# Patient Record
Sex: FEMALE | Race: WHITE | Employment: OTHER | URBAN - METROPOLITAN AREA
[De-identification: names, ages, dates, MRNs, and addresses within clinical notes are randomized per-mention and may not be internally consistent; named-entity substitution may affect disease eponyms.]

---

## 2017-01-12 ENCOUNTER — HOSPITAL ENCOUNTER (OUTPATIENT)
Dept: MAMMOGRAPHY | Facility: CLINIC | Age: 82
Discharge: HOME | End: 2017-01-12
Attending: INTERNAL MEDICINE | Admitting: INTERNAL MEDICINE
Payer: MEDICARE

## 2017-01-12 DIAGNOSIS — Z12.31 VISIT FOR SCREENING MAMMOGRAM: ICD-10-CM

## 2017-01-12 PROCEDURE — G0202 SCR MAMMO BI INCL CAD: HCPCS | Mod: 52

## 2017-02-24 ENCOUNTER — TRANSFERRED RECORDS (OUTPATIENT)
Dept: HEALTH INFORMATION MANAGEMENT | Facility: CLINIC | Age: 82
End: 2017-02-24

## 2017-07-21 ENCOUNTER — TRANSFERRED RECORDS (OUTPATIENT)
Dept: HEALTH INFORMATION MANAGEMENT | Facility: CLINIC | Age: 82
End: 2017-07-21

## 2017-08-02 ENCOUNTER — TRANSFERRED RECORDS (OUTPATIENT)
Dept: HEALTH INFORMATION MANAGEMENT | Facility: CLINIC | Age: 82
End: 2017-08-02

## 2017-08-07 ENCOUNTER — HOSPITAL ENCOUNTER (OUTPATIENT)
Dept: ULTRASOUND IMAGING | Facility: CLINIC | Age: 82
End: 2017-08-07
Attending: INTERNAL MEDICINE
Payer: MEDICARE

## 2017-08-07 PROCEDURE — 88341 IMHCHEM/IMCYTCHM EA ADD ANTB: CPT | Mod: 26 | Performed by: INTERNAL MEDICINE

## 2017-08-07 PROCEDURE — 88360 TUMOR IMMUNOHISTOCHEM/MANUAL: CPT | Mod: 26 | Performed by: INTERNAL MEDICINE

## 2017-08-07 PROCEDURE — 88305 TISSUE EXAM BY PATHOLOGIST: CPT | Mod: 26 | Performed by: INTERNAL MEDICINE

## 2017-08-07 PROCEDURE — 88377 M/PHMTRC ALYS ISHQUANT/SEMIQ: CPT | Performed by: PATHOLOGY

## 2017-08-07 PROCEDURE — 25000125 ZZHC RX 250: Performed by: RADIOLOGY

## 2017-08-07 PROCEDURE — 00000158 ZZHCL STATISTIC H-FISH PROCESS B/S: Performed by: INTERNAL MEDICINE

## 2017-08-07 PROCEDURE — 88341 IMHCHEM/IMCYTCHM EA ADD ANTB: CPT | Mod: XU | Performed by: INTERNAL MEDICINE

## 2017-08-07 PROCEDURE — 88342 IMHCHEM/IMCYTCHM 1ST ANTB: CPT | Mod: 26 | Performed by: INTERNAL MEDICINE

## 2017-08-07 PROCEDURE — 88305 TISSUE EXAM BY PATHOLOGIST: CPT | Performed by: INTERNAL MEDICINE

## 2017-08-07 PROCEDURE — 76942 ECHO GUIDE FOR BIOPSY: CPT

## 2017-08-07 PROCEDURE — 88360 TUMOR IMMUNOHISTOCHEM/MANUAL: CPT | Performed by: INTERNAL MEDICINE

## 2017-08-07 PROCEDURE — 27211101 US BIOPSY CORE LYMPH NODE

## 2017-08-07 PROCEDURE — 00000159 ZZHCL STATISTIC H-SEND OUTS PREP: Performed by: INTERNAL MEDICINE

## 2017-08-07 PROCEDURE — 88342 IMHCHEM/IMCYTCHM 1ST ANTB: CPT | Performed by: INTERNAL MEDICINE

## 2017-08-07 RX ADMIN — LIDOCAINE HYDROCHLORIDE 3 ML: 10 INJECTION, SOLUTION EPIDURAL; INFILTRATION; INTRACAUDAL; PERINEURAL at 11:08

## 2017-08-07 NOTE — PROGRESS NOTES
Patient discharged to home, all discharge criteria were met. Discharged via wheelchair with daughter in stable condition.

## 2017-08-07 NOTE — PROGRESS NOTES
Left sternal lymph node biopsy completed per Dr. Aleman. Patient had increased pain with each sample taken, Dr. Aleman felt he may have irritated a nerve. Patient anxious and shaking post procedure, daughter stated this has happened to her in the past with previous procedures. No sedation was used for this procedure, patient transferred to holding room via cart in stable condition.

## 2017-08-14 LAB — COPATH REPORT: NORMAL

## 2017-08-15 LAB — COPATH REPORT: NORMAL

## 2017-08-17 DIAGNOSIS — C50.919 BREAST CANCER (H): Primary | ICD-10-CM

## 2017-09-25 ENCOUNTER — HOSPITAL ENCOUNTER (OUTPATIENT)
Dept: CARDIOLOGY | Facility: CLINIC | Age: 82
Discharge: HOME | End: 2017-09-25
Attending: INTERNAL MEDICINE | Admitting: INTERNAL MEDICINE
Payer: MEDICARE

## 2017-09-25 DIAGNOSIS — C50.412 MALIGNANT NEOPLASM OF UPPER-OUTER QUADRANT OF LEFT FEMALE BREAST, UNSPECIFIED ESTROGEN RECEPTOR STATUS (H): ICD-10-CM

## 2017-09-25 DIAGNOSIS — Z51.11 CHEMOTHERAPY MANAGEMENT, ENCOUNTER FOR: ICD-10-CM

## 2017-09-25 PROCEDURE — 93306 TTE W/DOPPLER COMPLETE: CPT | Mod: 26 | Performed by: INTERNAL MEDICINE

## 2017-09-25 PROCEDURE — 93306 TTE W/DOPPLER COMPLETE: CPT

## 2017-09-25 PROCEDURE — 0399T ZZHC MYOCARDIAL STRAIN IMAGING: CPT | Performed by: INTERNAL MEDICINE

## 2017-09-26 ENCOUNTER — HOSPITAL ENCOUNTER (OUTPATIENT)
Facility: CLINIC | Age: 82
Setting detail: OBSERVATION
Discharge: HOME | End: 2017-09-27
Attending: EMERGENCY MEDICINE | Admitting: INTERNAL MEDICINE
Payer: MEDICARE

## 2017-09-26 ENCOUNTER — APPOINTMENT (OUTPATIENT)
Dept: GENERAL RADIOLOGY | Facility: CLINIC | Age: 82
End: 2017-09-26
Attending: EMERGENCY MEDICINE
Payer: MEDICARE

## 2017-09-26 DIAGNOSIS — J06.9 VIRAL URI: Primary | ICD-10-CM

## 2017-09-26 DIAGNOSIS — J06.9 UPPER RESPIRATORY TRACT INFECTION, UNSPECIFIED TYPE: ICD-10-CM

## 2017-09-26 DIAGNOSIS — M62.81 GENERALIZED MUSCLE WEAKNESS: ICD-10-CM

## 2017-09-26 LAB
ANION GAP SERPL CALCULATED.3IONS-SCNC: 9 MMOL/L (ref 3–14)
BASOPHILS # BLD AUTO: 0 10E9/L (ref 0–0.2)
BASOPHILS NFR BLD AUTO: 0.6 %
BUN SERPL-MCNC: 11 MG/DL (ref 7–30)
CALCIUM SERPL-MCNC: 10 MG/DL (ref 8.5–10.1)
CHLORIDE SERPL-SCNC: 100 MMOL/L (ref 94–109)
CO2 BLDCOV-SCNC: 22 MMOL/L (ref 21–28)
CO2 SERPL-SCNC: 25 MMOL/L (ref 20–32)
CREAT SERPL-MCNC: 0.94 MG/DL (ref 0.52–1.04)
DIFFERENTIAL METHOD BLD: ABNORMAL
EOSINOPHIL # BLD AUTO: 0 10E9/L (ref 0–0.7)
EOSINOPHIL NFR BLD AUTO: 0.9 %
ERYTHROCYTE [DISTWIDTH] IN BLOOD BY AUTOMATED COUNT: 12.5 % (ref 10–15)
GFR SERPL CREATININE-BSD FRML MDRD: 57 ML/MIN/1.7M2
GLUCOSE SERPL-MCNC: 134 MG/DL (ref 70–99)
HCT VFR BLD AUTO: 31.6 % (ref 35–47)
HGB BLD-MCNC: 10.8 G/DL (ref 11.7–15.7)
IMM GRANULOCYTES # BLD: 0 10E9/L (ref 0–0.4)
IMM GRANULOCYTES NFR BLD: 0.2 %
LACTATE BLD-SCNC: 1 MMOL/L (ref 0.7–2.1)
LYMPHOCYTES # BLD AUTO: 0.5 10E9/L (ref 0.8–5.3)
LYMPHOCYTES NFR BLD AUTO: 10.7 %
MCH RBC QN AUTO: 31.5 PG (ref 26.5–33)
MCHC RBC AUTO-ENTMCNC: 34.2 G/DL (ref 31.5–36.5)
MCV RBC AUTO: 92 FL (ref 78–100)
MONOCYTES # BLD AUTO: 0.4 10E9/L (ref 0–1.3)
MONOCYTES NFR BLD AUTO: 8.8 %
NEUTROPHILS # BLD AUTO: 3.7 10E9/L (ref 1.6–8.3)
NEUTROPHILS NFR BLD AUTO: 78.8 %
NRBC # BLD AUTO: 0 10*3/UL
NRBC BLD AUTO-RTO: 0 /100
PCO2 BLDV: 23 MM HG (ref 40–50)
PH BLDV: 7.59 PH (ref 7.32–7.43)
PLATELET # BLD AUTO: 186 10E9/L (ref 150–450)
PO2 BLDV: 44 MM HG (ref 25–47)
POTASSIUM SERPL-SCNC: 3.8 MMOL/L (ref 3.4–5.3)
RBC # BLD AUTO: 3.43 10E12/L (ref 3.8–5.2)
SAO2 % BLDV FROM PO2: 88 %
SODIUM SERPL-SCNC: 134 MMOL/L (ref 133–144)
WBC # BLD AUTO: 4.7 10E9/L (ref 4–11)

## 2017-09-26 PROCEDURE — 99218 ZZC INITIAL OBSERVATION CARE,LEVL I: CPT | Performed by: INTERNAL MEDICINE

## 2017-09-26 PROCEDURE — 25000128 H RX IP 250 OP 636: Performed by: EMERGENCY MEDICINE

## 2017-09-26 PROCEDURE — 99285 EMERGENCY DEPT VISIT HI MDM: CPT | Mod: 25

## 2017-09-26 PROCEDURE — 99207 ZZC CDG-HISTORY COMP: MEETS EXP. PROB FOCUSED- DOWN CODED LACK OF PFSH: CPT | Performed by: INTERNAL MEDICINE

## 2017-09-26 PROCEDURE — 85025 COMPLETE CBC W/AUTO DIFF WBC: CPT | Performed by: EMERGENCY MEDICINE

## 2017-09-26 PROCEDURE — 96360 HYDRATION IV INFUSION INIT: CPT

## 2017-09-26 PROCEDURE — 83605 ASSAY OF LACTIC ACID: CPT

## 2017-09-26 PROCEDURE — 80048 BASIC METABOLIC PNL TOTAL CA: CPT | Performed by: EMERGENCY MEDICINE

## 2017-09-26 PROCEDURE — 82803 BLOOD GASES ANY COMBINATION: CPT

## 2017-09-26 PROCEDURE — 25000132 ZZH RX MED GY IP 250 OP 250 PS 637: Mod: GY | Performed by: EMERGENCY MEDICINE

## 2017-09-26 PROCEDURE — 71020 XR CHEST 2 VW: CPT

## 2017-09-26 RX ORDER — LIDOCAINE 50 MG/G
1 PATCH TOPICAL
Status: DISCONTINUED | OUTPATIENT
Start: 2017-09-26 | End: 2017-09-27 | Stop reason: HOSPADM

## 2017-09-26 RX ORDER — BISACODYL 10 MG
10 SUPPOSITORY, RECTAL RECTAL DAILY PRN
Status: DISCONTINUED | OUTPATIENT
Start: 2017-09-26 | End: 2017-09-27 | Stop reason: HOSPADM

## 2017-09-26 RX ORDER — ONDANSETRON 2 MG/ML
4 INJECTION INTRAMUSCULAR; INTRAVENOUS EVERY 6 HOURS PRN
Status: DISCONTINUED | OUTPATIENT
Start: 2017-09-26 | End: 2017-09-27 | Stop reason: HOSPADM

## 2017-09-26 RX ORDER — PROCHLORPERAZINE MALEATE 5 MG
5 TABLET ORAL EVERY 6 HOURS PRN
Status: DISCONTINUED | OUTPATIENT
Start: 2017-09-26 | End: 2017-09-27 | Stop reason: HOSPADM

## 2017-09-26 RX ORDER — SODIUM CHLORIDE 9 MG/ML
INJECTION, SOLUTION INTRAVENOUS CONTINUOUS
Status: ACTIVE | OUTPATIENT
Start: 2017-09-26 | End: 2017-09-27

## 2017-09-26 RX ORDER — AMOXICILLIN 250 MG
1-2 CAPSULE ORAL 2 TIMES DAILY PRN
Status: DISCONTINUED | OUTPATIENT
Start: 2017-09-26 | End: 2017-09-27 | Stop reason: HOSPADM

## 2017-09-26 RX ORDER — ACETAMINOPHEN 500 MG
1000 TABLET ORAL 3 TIMES DAILY
Status: DISCONTINUED | OUTPATIENT
Start: 2017-09-27 | End: 2017-09-27 | Stop reason: HOSPADM

## 2017-09-26 RX ORDER — POLYETHYLENE GLYCOL 3350 17 G/17G
17 POWDER, FOR SOLUTION ORAL DAILY
Status: DISCONTINUED | OUTPATIENT
Start: 2017-09-27 | End: 2017-09-27 | Stop reason: HOSPADM

## 2017-09-26 RX ORDER — PSEUDOEPHEDRINE HCL 30 MG
60 TABLET ORAL EVERY 6 HOURS
Status: DISCONTINUED | OUTPATIENT
Start: 2017-09-27 | End: 2017-09-27 | Stop reason: HOSPADM

## 2017-09-26 RX ORDER — MULTIVIT WITH MINERALS/LUTEIN
1 TABLET ORAL DAILY
COMMUNITY
End: 2021-01-01

## 2017-09-26 RX ORDER — ZOLPIDEM TARTRATE 5 MG/1
5 TABLET ORAL
Status: DISCONTINUED | OUTPATIENT
Start: 2017-09-26 | End: 2017-09-27 | Stop reason: HOSPADM

## 2017-09-26 RX ORDER — ONDANSETRON 4 MG/1
4 TABLET, ORALLY DISINTEGRATING ORAL EVERY 6 HOURS PRN
Status: DISCONTINUED | OUTPATIENT
Start: 2017-09-26 | End: 2017-09-27 | Stop reason: HOSPADM

## 2017-09-26 RX ORDER — HYDROCODONE BITARTRATE AND ACETAMINOPHEN 5; 325 MG/1; MG/1
1-2 TABLET ORAL EVERY 6 HOURS PRN
COMMUNITY
End: 2018-03-17

## 2017-09-26 RX ORDER — DIPHENHYDRAMINE HCL 25 MG
25 CAPSULE ORAL
Status: DISCONTINUED | OUTPATIENT
Start: 2017-09-26 | End: 2017-09-27 | Stop reason: HOSPADM

## 2017-09-26 RX ORDER — NALOXONE HYDROCHLORIDE 0.4 MG/ML
.1-.4 INJECTION, SOLUTION INTRAMUSCULAR; INTRAVENOUS; SUBCUTANEOUS
Status: DISCONTINUED | OUTPATIENT
Start: 2017-09-26 | End: 2017-09-27 | Stop reason: HOSPADM

## 2017-09-26 RX ORDER — GUAIFENESIN 600 MG/1
600 TABLET, EXTENDED RELEASE ORAL 2 TIMES DAILY
Status: DISCONTINUED | OUTPATIENT
Start: 2017-09-26 | End: 2017-09-27 | Stop reason: HOSPADM

## 2017-09-26 RX ORDER — PROCHLORPERAZINE 25 MG
12.5 SUPPOSITORY, RECTAL RECTAL EVERY 12 HOURS PRN
Status: DISCONTINUED | OUTPATIENT
Start: 2017-09-26 | End: 2017-09-27 | Stop reason: HOSPADM

## 2017-09-26 RX ADMIN — GUAIFENESIN AND DEXTROMETHORPHAN HYDROBROMIDE 1 TABLET: 600; 30 TABLET, EXTENDED RELEASE ORAL at 22:11

## 2017-09-26 RX ADMIN — SODIUM CHLORIDE 500 ML: 9 INJECTION, SOLUTION INTRAVENOUS at 21:41

## 2017-09-26 ASSESSMENT — ENCOUNTER SYMPTOMS
COUGH: 1
SHORTNESS OF BREATH: 1
SINUS PRESSURE: 1

## 2017-09-26 NOTE — IP AVS SNAPSHOT
MRN:[id number]                      After Visit Summary   9/26/2017    Lakisha Burroughs    MRN: [id number]           Thank you!     Thank you for choosing [facility] for your care. Our goal is always to provide you with excellent care. Hearing back from our patients is one way we can continue to improve our services. Please take a few minutes to complete the written survey that you may receive in the mail after you visit. If you would like to speak to someone directly about your visit please contact Patient Relations at [phone]. Thank you!          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 You were treated supportively with PO Mucinex and Sudafed along with Tylenol for pain. Your symptoms improved and you were safe to discharge home.                  Who to Call     For medical emergencies, please call 911.  For non-urgent questions about your medical care, please call your primary care provider or clinic, [phone]          Attending Provider     Provider Specialty    Kasandra Babcock MD Emergency Medicine    [place] MD Internal Medicine       Primary Care Provider Office Phone # Fax #    Kishore Valladares [phone]      After Care Instructions     Activity       Your activity upon discharge: activity as tolerated            Diet       Follow this diet upon discharge: Orders Placed This Encounter      Regular Diet Adult            Discharge Instructions       Continue scheduled Tylenol at home for the next 2-3 days with continued persistent  "cough, while doing so refrain from using your home Norco due to concern for Tylenol overdose.                  Follow-up Appointments     Follow-up and recommended labs and tests        Follow up with primary care provider within 7 days for hospital follow- up.  No follow up labs or test are needed.                             Pending Results     No orders found for last 3 day(s).            Statement of Approval     Ordered          17 1101  I have reviewed and agree with all the recommendations and orders detailed in this document.  EFFECTIVE NOW     Approved and electronically signed by:  Veronica Cunningham PA-C             Admission Information     Date & Time Provider Department Dept. Phone    2017 Ron Stephens MD [facility] Observation Department [phone]      Your Vitals Were     Blood Pressure Pulse Temperature Respirations Height Weight    140/77 (BP Location: Right arm) 83 97.6  F (36.4  C) (Oral) 18 1.803 m (5' 11\") 71.9 kg (158 lb 9.6 oz)    Pulse Oximetry BMI (Body Mass Index)                96% 22.12 kg/m2          [email or web address] Information     [email or web address] lets you send messages to your doctor, view your test results, renew your prescriptions, schedule appointments and more. To sign up, go to [email or web address] . Click on \"Log in\" on the left side of the screen, which will take you to the Welcome page. Then click on \"Sign up Now\" on the right side of the page.     You will be asked to enter the access code listed below, as well as some personal information. Please follow the directions to create your username and password.     Your access code is: [email or web address]  Expires: 2017 12:05 PM     Your access code will  in 90 days. If you need help or a new code, please call your [facility] clinic or [phone].        Care EveryWhere ID     This is your Care EveryWhere ID. This could be used by other organizations to access your [facility] medical " records  [id number]        Equal Access to Services     LEI ECHEVERRIA : Hadii aad ku hadleonrahul Cerrato, waaxda luqadaha, qaybta kaalmaerik sahni, katelyn guerrero. So [facility] [phone].    ATENCIÓN: Si habla español, tiene a guerrero disposición servicios gratuitos de asistencia lingüística. Llame al [phone].    We comply with applicable federal civil rights laws and [place] laws. We do not discriminate on the basis of race, color, national origin, age, disability sex, sexual orientation or gender identity.               Review of your medicines      START taking        Dose / Directions    acetaminophen 500 MG tablet   Commonly known as:  TYLENOL        Dose:  1000 mg   Take 2 tablets (1,000 mg) by mouth 3 times daily   Refills:  0       guaiFENesin 600 MG 12 hr tablet   Commonly known as:  MUCINEX        Dose:  600 mg   Take 1 tablet (600 mg) by mouth 2 times daily   Quantity:  28 tablet   Refills:  0       pseudoePHEDrine 60 MG tablet   Commonly known as:  SUDAFED        Dose:  60 mg   Take 1 tablet (60 mg) by mouth every 6 hours   Quantity:  112 tablet   Refills:  0         CONTINUE these medicines which have NOT CHANGED        Dose / Directions    ALEVE PO        Dose:  220 mg   Take 220 mg by mouth daily as needed for moderate pain   Refills:  0       BENADRYL PO        Dose:  25 mg   Take 25 mg by mouth nightly as needed   Refills:  0       CENTRUM SILVER per tablet        Dose:  1 tablet   Take 1 tablet by mouth daily   Refills:  0       HYDROcodone-acetaminophen 5-325 MG per tablet   Commonly known as:  NORCO        Dose:  1-2 tablet   Take 1-2 tablets by mouth every 6 hours as needed for moderate to severe pain   Refills:  0       VITAMIN B 12 PO        Dose:  1 tablet   Take 1 tablet by mouth daily Chewable tablet   Refills:  0       VITAMIN D (CHOLECALCIFEROL) PO        Dose:  1000 Units   Take 1,000 Units by mouth daily   Refills:  0       ZOLPIDEM TARTRATE PO        Dose:  5  mg   Take 5 mg by mouth nightly as needed for sleep   Refills:  0            Where to get your medicines      Some of these will need a paper prescription and others can be bought over the counter. Ask your nurse if you have questions.     You don't need a prescription for these medications     acetaminophen 500 MG tablet    guaiFENesin 600 MG 12 hr tablet    pseudoePHEDrine 60 MG tablet                Protect others around you: Learn how to safely use, store and throw away your medicines at www.disposemymeds.org.             Medication List: This is a list of all your medications and when to take them. Check marks below indicate your daily home schedule. Keep this list as a reference.      Medications           Morning Afternoon Evening Bedtime As Needed    acetaminophen 500 MG tablet   Commonly known as:  TYLENOL   Take 2 tablets (1,000 mg) by mouth 3 times daily   Last time this was given:  1,000 mg on 9/27/2017  8:41 AM                                ALEVE PO   Take 220 mg by mouth daily as needed for moderate pain                                BENADRYL PO   Take 25 mg by mouth nightly as needed                                CENTRUM SILVER per tablet   Take 1 tablet by mouth daily                                guaiFENesin 600 MG 12 hr tablet   Commonly known as:  MUCINEX   Take 1 tablet (600 mg) by mouth 2 times daily   Last time this was given:  600 mg on 9/27/2017  8:41 AM                                HYDROcodone-acetaminophen 5-325 MG per tablet   Commonly known as:  NORCO   Take 1-2 tablets by mouth every 6 hours as needed for moderate to severe pain                                pseudoePHEDrine 60 MG tablet   Commonly known as:  SUDAFED   Take 1 tablet (60 mg) by mouth every 6 hours   Last time this was given:  60 mg on 9/27/2017 12:11 PM                                VITAMIN B 12 PO   Take 1 tablet by mouth daily Chewable tablet                                VITAMIN D (CHOLECALCIFEROL) PO   Take  1,000 Units by mouth daily                                ZOLPIDEM TARTRATE PO   Take 5 mg by mouth nightly as needed for sleep   Last time this was given:  5 mg on 9/27/2017  1:27 AM

## 2017-09-26 NOTE — IP AVS SNAPSHOT
[facility] Observation Department    [address]    [facility] [address]    Phone:  [phone]                                       After Visit Summary   9/26/2017    Lakisha Burroughs    MRN: [id number]           After Visit Summary Signature Page     I have received my discharge instructions, and my questions have been answered. I have discussed any challenges I see with this plan with the nurse or doctor.    ..........................................................................................................................................  Patient/Patient Representative Signature      ..........................................................................................................................................  Patient Representative Print Name and Relationship to Patient    ..................................................               ................................................  Date                                            Time    ..........................................................................................................................................  Reviewed by Signature/Title    ...................................................              ..............................................  Date                                                            Time

## 2017-09-27 VITALS
OXYGEN SATURATION: 96 % | WEIGHT: 158.6 LBS | BODY MASS INDEX: 22.2 KG/M2 | TEMPERATURE: 97.6 F | SYSTOLIC BLOOD PRESSURE: 140 MMHG | RESPIRATION RATE: 18 BRPM | HEIGHT: 71 IN | DIASTOLIC BLOOD PRESSURE: 77 MMHG | HEART RATE: 83 BPM

## 2017-09-27 PROCEDURE — 25000128 H RX IP 250 OP 636: Performed by: INTERNAL MEDICINE

## 2017-09-27 PROCEDURE — 99217 ZZC OBSERVATION CARE DISCHARGE: CPT | Performed by: PHYSICIAN ASSISTANT

## 2017-09-27 PROCEDURE — 96361 HYDRATE IV INFUSION ADD-ON: CPT

## 2017-09-27 PROCEDURE — G0378 HOSPITAL OBSERVATION PER HR: HCPCS

## 2017-09-27 PROCEDURE — 25000132 ZZH RX MED GY IP 250 OP 250 PS 637: Mod: GY | Performed by: INTERNAL MEDICINE

## 2017-09-27 PROCEDURE — A9270 NON-COVERED ITEM OR SERVICE: HCPCS | Mod: GY | Performed by: INTERNAL MEDICINE

## 2017-09-27 RX ORDER — PSEUDOEPHEDRINE HCL 60 MG
60 TABLET ORAL EVERY 6 HOURS
Qty: 112 TABLET | COMMUNITY
Start: 2017-09-27 | End: 2018-03-17

## 2017-09-27 RX ORDER — ACETAMINOPHEN 500 MG
1000 TABLET ORAL 3 TIMES DAILY
Refills: 0 | COMMUNITY
Start: 2017-09-27

## 2017-09-27 RX ORDER — GUAIFENESIN 600 MG/1
600 TABLET, EXTENDED RELEASE ORAL 2 TIMES DAILY
Qty: 28 TABLET | Refills: 0 | COMMUNITY
Start: 2017-09-27 | End: 2018-03-17

## 2017-09-27 RX ADMIN — PSEUDOEPHEDRINE HCL 60 MG: 30 TABLET, FILM COATED ORAL at 12:11

## 2017-09-27 RX ADMIN — PSEUDOEPHEDRINE HCL 60 MG: 30 TABLET, FILM COATED ORAL at 08:41

## 2017-09-27 RX ADMIN — SENNOSIDES AND DOCUSATE SODIUM 1 TABLET: 8.6; 5 TABLET ORAL at 06:25

## 2017-09-27 RX ADMIN — GUAIFENESIN 600 MG: 600 TABLET, EXTENDED RELEASE ORAL at 08:41

## 2017-09-27 RX ADMIN — LIDOCAINE 1 PATCH: 50 PATCH CUTANEOUS at 00:12

## 2017-09-27 RX ADMIN — POLYETHYLENE GLYCOL 3350 17 G: 17 POWDER, FOR SOLUTION ORAL at 08:41

## 2017-09-27 RX ADMIN — ACETAMINOPHEN 1000 MG: 500 TABLET, FILM COATED ORAL at 08:41

## 2017-09-27 RX ADMIN — PSEUDOEPHEDRINE HCL 60 MG: 30 TABLET, FILM COATED ORAL at 01:19

## 2017-09-27 RX ADMIN — GUAIFENESIN 600 MG: 600 TABLET, EXTENDED RELEASE ORAL at 00:12

## 2017-09-27 RX ADMIN — SODIUM CHLORIDE: 9 INJECTION, SOLUTION INTRAVENOUS at 00:02

## 2017-09-27 RX ADMIN — ZOLPIDEM TARTRATE 5 MG: 5 TABLET, FILM COATED ORAL at 01:27

## 2017-09-27 NOTE — PROGRESS NOTES
CTS:    Per rounds with provider this morning, pt has no DC needs at this time.  Will complete order for SW.    Chula Alex, RN,BSN, CTS  [facility]  Care Coordination  [phone]

## 2017-09-27 NOTE — PLAN OF CARE
Problem: Patient Care Overview  Goal: Plan of Care/Patient Progress Review  Outcome: Adequate for Discharge Date Met:  09/27/17  Patient's After Visit Summary was reviewed with patient and Daughter  Patient verbalized understanding of After Visit Summary, recommended follow up and was given an opportunity to ask questions.   Discharge medications sent home with patient/family: NA  Discharged with Daughter     OBSERVATION patient END time: 1320

## 2017-09-27 NOTE — PLAN OF CARE
ROOM # 207    Living Situation (if not independent, order SW consult): Home, alone. Two-story home.  Facility name:   : [facility] ([phone]) - Daughter    Activity level at baseline: Independent  Activity level on admit: SBA      Patient registered to observation; given Patient Bill of Rights; given the opportunity to ask questions about observation status and their plan of care.  Patient has been oriented to the observation room, bathroom and call light is in place.    Discussed discharge goals and expectations with patient/family.

## 2017-09-27 NOTE — PLAN OF CARE
Problem: Patient Care Overview  Goal: Plan of Care/Patient Progress Review  Outcome: Improving  PRIMARY DIAGNOSIS: Upper Respiratory Infection  OUTPATIENT/OBSERVATION GOALS TO BE MET BEFORE DISCHARGE:  1. ADLs back to baseline: Yes      2. Activity and level of assistance: Ambulating independently.      3. Pain status: Pain free.      4. Return to near baseline physical activity: Yes          Discharge Planner Nurse   Safe discharge environment identified: Yes  Barriers to discharge: Pending medical clearance       Entered by: Jasbir You 09/27/2017 9:37 AM     Please review provider order for any additional goals.   Nurse to notify provider when observation goals have been met and patient is ready for discharge.     Pt A&O, VSS. C/o occasional productive cough producing white sputum. Pain with cough d/t Hx sternal pain but otherwise pain free. Pt reports she is improving, and able to breath. She is up with a SBA to bathroom, tolerated walking in hallway.

## 2017-09-27 NOTE — H&P
[facility]  History and Physical   Hospitalist Service    Ron Stephens MD    Lakisha Burroughs MRN# [id number]   YOB: 1935 Age: 81 year old      Date of Admission:  9/26/2017           Assessment and Plan:   Lakisha Burroughs is an 81-year-old female with history of colon cancer in the 1970's and breast cancer for the last 8 years.  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  She has recently completed radiation therapy for this with plans to start chemotherapy in the near future.  She presented to the emergency department this evening for evaluation of 2 days of cough, nasal congestion, shortness of breath, and weakness.  Emergency department workup suggested viral respiratory tract infection.  There was no pneumonia by chest x-ray. There was no associated leukocytosis.  She did have tachycardia and has had significant chest pain related to her sternal metastases that has been exacerbated by her current cough.    Problem list:    1.  Viral upper respiratory tract infection.  She is having significant shortness of breath and anterior chest pain as a result of this.  Chest pain is likely related to her sternal metastases from breast cancer with exacerbation by coughing.  Lakisha will be admitted to observation for symptom management. Mucinex and  Pseudoephedrine will be scheduled every 12 hours.  Tylenol will be scheduled 1000 mg 3 times daily.  Lidoderm patch will be scheduled and ketoprofen cream will be available for use as needed.  Oxycodone will be available for use as needed.    2.  History of constipation with hydrocodone.  I will schedule Miralax and have other laxatives available for use as needed.    3. History of breast cancer with recent diagnosis of sternal metastases.    4.  Weakness.  Suspect due to one above.  She may also be a little dehydrated.  Normal saline will be given overnight.    Full code  Ambulate for DVT prophylaxis  Disposition:  Admitted under  observation status           Code Status:   Full Code         Primary Care Physician:   Kishore Valladares [phone]         Chief Complaint:   Cough and shortness of breath    History is obtained from Dr. Sadiq Banuelos, and the medical record         History of Present Illness:   Lakisha Burroughs is an 81-year-old female with history of colon cancer in the 1970's and breast cancer for the last 8 years.  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  She has recently completed radiation therapy for this with plans to start chemotherapy in the near future.  She presented to the emergency department this evening for evaluation of 2 days of cough, nasal congestion, shortness of breath, and weakness.  Emergency department workup suggested viral respiratory tract infection.  There was no pneumonia by chest x-ray. There was no associated leukocytosis.  She did have tachycardia and has had significant chest pain related to her sternal metastases that has been exacerbated by her current cough.             Past Medical History:     Patient Active Problem List   Diagnosis     Upper respiratory tract infection, unspecified type     Generalized muscle weakness     Viral URI      Past Medical History:   Diagnosis Date     Cancer (H)     colon, breast, ROWENA             Past Surgical History:     Past Surgical History:   Procedure Laterality Date     BREAST SURGERY       ORTHOPEDIC SURGERY              Home Medications:     Prior to Admission medications    Medication Sig Last Dose Taking? Auth Provider   HYDROcodone-acetaminophen (NORCO) 5-325 MG per tablet Take 1-2 tablets by mouth every 6 hours as needed for moderate to severe pain Past Month at Unknown time Yes Unknown, Entered By History   ZOLPIDEM TARTRATE PO Take 5 mg by mouth nightly as needed for sleep 9/25/2017 at pm Yes Unknown, Entered By History   Cyanocobalamin (VITAMIN B 12 PO) Take 1 tablet by mouth daily Chewable tablet 9/26/2017 at am Yes Unknown, Entered  "By History   Multiple Vitamins-Minerals (CENTRUM SILVER) per tablet Take 1 tablet by mouth daily 9/26/2017 at am Yes Unknown, Entered By History   VITAMIN D, CHOLECALCIFEROL, PO Take 1,000 Units by mouth daily 9/26/2017 at am Yes Unknown, Entered By History   Naproxen Sodium (ALEVE PO) Take 220 mg by mouth daily as needed for moderate pain Past Week at Unknown time Yes Unknown, Entered By History   DiphenhydrAMINE HCl (BENADRYL PO) Take 25 mg by mouth nightly as needed Past Week at Unknown time Yes Unknown, Entered By History            Allergies:     Allergies   Allergen Reactions     Iodine Hives     Had iodine allergy 30 yrs ago gets premeds prior to tests. Unsure what the allergy was, Unknown any further details            Social History:     Social History   Substance Use Topics     Smoking status: Never Smoker     Smokeless tobacco: Never Used     Alcohol use No             Family History:   No pertinent family medical history            Review of Systems:   The 10 point Review of Systems is negative other than as noted in the HPI.           Physical Exam:   Blood pressure 151/81, temperature 96.6  F (35.9  C), temperature source Oral, resp. rate 18, height 1.803 m (5' 11\"), weight 71.9 kg (158 lb 9.6 oz), SpO2 97 %, not currently breastfeeding.  158 lbs 9.6 oz      GENERAL: Pleasant and cooperative. Mild to moderate acute distress.  EYES: Pupils equal and round. No scleral erythema or icterus.  ENT: External ears are normal without deformity. Posterior oropharynx is without erythem, swelling, or exudate. Nasal congestion  NECK: Supple. No masses or swelling. No tenderness. Thyroid is normal without mass or tenderness.  CHEST: Clear to auscultation. Normal breath sounds. No retractions.   CV: Regular rate and rhythm. No JVD. Pulses normal.  ABDOMEN: Bowel sounds present. No tenderness. No masses or hernia.  EXTREMETIES: No clubbing, cyanosis, or ischemia.  SKIN: Warm and dry to touch. No wounds or " rashes.  NEUROLOGIC: Strength and sensation are normal. Deep tendon reflexes are normal. Cranial nerves are normal.             Data:   All new lab and imaging data was reviewed.     Results for orders placed or performed during the hospital encounter of 09/26/17 (from the past 24 hour(s))   CBC with platelets differential   Result Value Ref Range    WBC 4.7 4.0 - 11.0 10e9/L    RBC Count 3.43 (L) 3.8 - 5.2 10e12/L    Hemoglobin 10.8 (L) 11.7 - 15.7 g/dL    Hematocrit 31.6 (L) 35.0 - 47.0 %    MCV 92 78 - 100 fl    MCH 31.5 26.5 - 33.0 pg    MCHC 34.2 31.5 - 36.5 g/dL    RDW 12.5 10.0 - 15.0 %    Platelet Count 186 150 - 450 10e9/L    Diff Method Automated Method     % Neutrophils 78.8 %    % Lymphocytes 10.7 %    % Monocytes 8.8 %    % Eosinophils 0.9 %    % Basophils 0.6 %    % Immature Granulocytes 0.2 %    Nucleated RBCs 0 0 /100    Absolute Neutrophil 3.7 1.6 - 8.3 10e9/L    Absolute Lymphocytes 0.5 (L) 0.8 - 5.3 10e9/L    Absolute Monocytes 0.4 0.0 - 1.3 10e9/L    Absolute Eosinophils 0.0 0.0 - 0.7 10e9/L    Absolute Basophils 0.0 0.0 - 0.2 10e9/L    Abs Immature Granulocytes 0.0 0 - 0.4 10e9/L    Absolute Nucleated RBC 0.0    Basic metabolic panel   Result Value Ref Range    Sodium 134 133 - 144 mmol/L    Potassium 3.8 3.4 - 5.3 mmol/L    Chloride 100 94 - 109 mmol/L    Carbon Dioxide 25 20 - 32 mmol/L    Anion Gap 9 3 - 14 mmol/L    Glucose 134 (H) 70 - 99 mg/dL    Urea Nitrogen 11 7 - 30 mg/dL    Creatinine 0.94 0.52 - 1.04 mg/dL    GFR Estimate 57 (L) >60 mL/min/1.7m2    GFR Estimate If Black 69 >60 mL/min/1.7m2    Calcium 10.0 8.5 - 10.1 mg/dL   ISTAT gases lactate paige POCT   Result Value Ref Range    Ph Venous 7.59 (H) 7.32 - 7.43 pH    PCO2 Venous 23 (L) 40 - 50 mm Hg    PO2 Venous 44 25 - 47 mm Hg    Bicarbonate Venous 22 21 - 28 mmol/L    O2 Sat Venous 88 %    Lactic Acid 1.0 0.7 - 2.1 mmol/L   XR Chest 2 Views    Narrative    CHEST TWO VIEWS  9/26/2017 9:04 PM     HISTORY: Cough. Shortness of  breath.    COMPARISON: None.      Impression    IMPRESSION: Mild elevation of the left hemidiaphragm is unchanged. The  lungs are otherwise clear. No pleural effusions. Heart size and  pulmonary vascularity are within normal limits.    PRO HENRIQUEZ MD

## 2017-09-27 NOTE — PHARMACY-ADMISSION MEDICATION HISTORY
Admission medication history interview status for this patient is complete. See [facility] admission navigator for allergy information, prior to admission medications and immunization status.     Medication history interview source(s):Patient and Family  Medication history resources (including written lists, pill bottles, clinic record):None    Changes made to PTA medication list:  Added: all  Deleted: none  Changed: none    Actions taken by pharmacist (provider contacted, etc):None     Additional medication history information:None    Medication reconciliation/reorder completed by provider prior to medication history? No    Do you take OTC medications (eg tylenol, ibuprofen, fish oil, eye/ear drops, etc)? Y(Y/N)    For patients on insulin therapy: N (Y/N)    Time spent in this activity: 10 min    Prior to Admission medications    Medication Sig Last Dose Taking? Auth Provider   HYDROcodone-acetaminophen (NORCO) 5-325 MG per tablet Take 1-2 tablets by mouth every 6 hours as needed for moderate to severe pain Past Month at Unknown time Yes Unknown, Entered By History   ZOLPIDEM TARTRATE PO Take 5 mg by mouth nightly as needed for sleep 9/25/2017 at pm Yes Unknown, Entered By History   Cyanocobalamin (VITAMIN B 12 PO) Take 1 tablet by mouth daily Chewable tablet 9/26/2017 at am Yes Unknown, Entered By History   Multiple Vitamins-Minerals (CENTRUM SILVER) per tablet Take 1 tablet by mouth daily 9/26/2017 at am Yes Unknown, Entered By History   VITAMIN D, CHOLECALCIFEROL, PO Take 1,000 Units by mouth daily 9/26/2017 at am Yes Unknown, Entered By History   Naproxen Sodium (ALEVE PO) Take 220 mg by mouth daily as needed for moderate pain Past Week at Unknown time Yes Unknown, Entered By History   DiphenhydrAMINE HCl (BENADRYL PO) Take 25 mg by mouth nightly as needed Past Week at Unknown time Yes Unknown, Entered By History

## 2017-09-27 NOTE — DISCHARGE SUMMARY
"[facility]    Observation Unit   Discharge Summary  Hospitalist    Date of Admission:  9/26/2017  Date of Discharge:  9/27/2017  Provider:  Raiza Cunningham PA-C  Date of Service (when I last saw the patient): 09/27/17    Discharge Diagnoses   Viral URI    Other medical issues:  Past Medical History:   Diagnosis Date     Cancer (H)     colon, breast, ROWENA     History of Present Illness   Lakisha Burroughs is an 81 year old female with PMH significant for colon cancer in the 1970s and breast cancer for the last 8 years with recent metastases to the sternum and completion of radiation therapy and plans to start chemotherapy this upcoming week who was admitted on 9/26/17 for viral URI with associated chest pain. Please see the admission history and physical for full details.    Hospital Course   Lakisha Burroughs was admitted on 9/26/2017. The following problems were addressed during her hospitalization:    1. 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 Improvement after scheduled Sudafed and Mucinex overnight with Tylenol for pain. Patient will continue current regimen upon discharge for the next 2-4 days as symptoms continue to improve. Contacted patient's oncologist's office to inform them of her admission since she is scheduled to start chemotherapy treatment next week and decide if they would like to continue with this plan or delay starting until she is feeling better, which would be my recommendation.     2. Weakness: likely due to #1, improved after IVFs.       Significant Results and Procedures   CXR: negative for infiltrates     Pending Results   None    Code Status   Full Code       Primary Care Physician   Kishore Valladares    Exam:    /77 (BP Location: Right arm)  Pulse 83  Temp 97.6  F (36.4  C) (Oral)  Resp 18  Ht 1.803 m (5' 11\")  Wt 71.9 kg (158 " lb 9.6 oz)  SpO2 96%  Breastfeeding? No  BMI 22.12 kg/m2  GENERAL:  Appears uncomfortable.  PSYCH: pleasant, oriented, NAD.  HEENT:  PERRLA. Normal conjunctiva, normal hearing, nasal mucosa and oropharynx are normal.  NECK:  Supple, no neck vein distention, adenopathy or bruits, normal thyroid.  HEART:  Normal S1, S2 with no murmur, no pericardial rub, S3 or S4.  CHEST: tenderness lateral of sternum beneath left breast   LUNGS:  Clear to auscultation, normal respiratory effort.  ABDOMEN:  Soft, no hepatosplenomegaly, normal bowel sounds.  EXTREMITIES:  No pedal edema, +2 pulses bilateral and equal.   SKIN:  Dry to touch, No rash, wound or ulcerations.  NEUROLOGIC:  Nonfocal with normal cranial nerve and motor power and sensation.     Discharge Disposition   Discharged to home    Consultations This Hospital Stay   SOCIAL WORK IP CONSULT    Time Spent on this Encounter   [facility], personally saw the patient today and spent less than or equal to 30 minutes discharging this patient.    Discharge Orders     Reason for your hospital stay   You were admitted due to concern for chest pain and shortness of breath related a viral upper respiratory in setting of known metastases to sternum. You were treated supportively with PO Mucinex and Sudafed along with Tylenol for pain. Your symptoms improved and you were safe to discharge home.     Follow-up and recommended labs and tests    Follow up with primary care provider within 7 days for hospital follow- up.  No follow up labs or test are needed.     Activity   Your activity upon discharge: activity as tolerated     Discharge Instructions   Continue scheduled Tylenol at home for the next 2-3 days with continued persistent cough, while doing so refrain from using your home Norco due to concern for Tylenol overdose.     Full Code     Diet   Follow this diet upon discharge: Orders Placed This Encounter     Regular Diet Adult       Discharge Medications   Current  Discharge Medication List      START taking these medications    Details   acetaminophen (TYLENOL) 500 MG tablet Take 2 tablets (1,000 mg) by mouth 3 times daily  Refills: 0    Associated Diagnoses: Viral URI      guaiFENesin (MUCINEX) 600 MG 12 hr tablet Take 1 tablet (600 mg) by mouth 2 times daily  Qty: 28 tablet, Refills: 0    Associated Diagnoses: Viral URI      pseudoePHEDrine (SUDAFED) 60 MG tablet Take 1 tablet (60 mg) by mouth every 6 hours  Qty: 112 tablet    Associated Diagnoses: Viral URI         CONTINUE these medications which have NOT CHANGED    Details   HYDROcodone-acetaminophen (NORCO) 5-325 MG per tablet Take 1-2 tablets by mouth every 6 hours as needed for moderate to severe pain      ZOLPIDEM TARTRATE PO Take 5 mg by mouth nightly as needed for sleep      Cyanocobalamin (VITAMIN B 12 PO) Take 1 tablet by mouth daily Chewable tablet      Multiple Vitamins-Minerals (CENTRUM SILVER) per tablet Take 1 tablet by mouth daily      VITAMIN D, CHOLECALCIFEROL, PO Take 1,000 Units by mouth daily      Naproxen Sodium (ALEVE PO) Take 220 mg by mouth daily as needed for moderate pain      DiphenhydrAMINE HCl (BENADRYL PO) Take 25 mg by mouth nightly as needed           Allergies   Allergies   Allergen Reactions     Iodine Hives     Had iodine allergy 30 yrs ago gets premeds prior to tests. Unsure what the allergy was, Unknown any further details     Data   Results for orders placed or performed during the hospital encounter of 09/26/17   XR Chest 2 Views    Narrative    CHEST TWO VIEWS  9/26/2017 9:04 PM     HISTORY: Cough. Shortness of breath.    COMPARISON: None.      Impression    IMPRESSION: Mild elevation of the left hemidiaphragm is unchanged. The  lungs are otherwise clear. No pleural effusions. Heart size and  pulmonary vascularity are within normal limits.    PRO HENRIQUEZ MD   CBC with platelets differential   Result Value Ref Range    WBC 4.7 4.0 - 11.0 10e9/L    RBC Count 3.43 (L) 3.8 - 5.2  10e12/L    Hemoglobin 10.8 (L) 11.7 - 15.7 g/dL    Hematocrit 31.6 (L) 35.0 - 47.0 %    MCV 92 78 - 100 fl    MCH 31.5 26.5 - 33.0 pg    MCHC 34.2 31.5 - 36.5 g/dL    RDW 12.5 10.0 - 15.0 %    Platelet Count 186 150 - 450 10e9/L    Diff Method Automated Method     % Neutrophils 78.8 %    % Lymphocytes 10.7 %    % Monocytes 8.8 %    % Eosinophils 0.9 %    % Basophils 0.6 %    % Immature Granulocytes 0.2 %    Nucleated RBCs 0 0 /100    Absolute Neutrophil 3.7 1.6 - 8.3 10e9/L    Absolute Lymphocytes 0.5 (L) 0.8 - 5.3 10e9/L    Absolute Monocytes 0.4 0.0 - 1.3 10e9/L    Absolute Eosinophils 0.0 0.0 - 0.7 10e9/L    Absolute Basophils 0.0 0.0 - 0.2 10e9/L    Abs Immature Granulocytes 0.0 0 - 0.4 10e9/L    Absolute Nucleated RBC 0.0    Basic metabolic panel   Result Value Ref Range    Sodium 134 133 - 144 mmol/L    Potassium 3.8 3.4 - 5.3 mmol/L    Chloride 100 94 - 109 mmol/L    Carbon Dioxide 25 20 - 32 mmol/L    Anion Gap 9 3 - 14 mmol/L    Glucose 134 (H) 70 - 99 mg/dL    Urea Nitrogen 11 7 - 30 mg/dL    Creatinine 0.94 0.52 - 1.04 mg/dL    GFR Estimate 57 (L) >60 mL/min/1.7m2    GFR Estimate If Black 69 >60 mL/min/1.7m2    Calcium 10.0 8.5 - 10.1 mg/dL   ISTAT gases lactate paige POCT   Result Value Ref Range    Ph Venous 7.59 (H) 7.32 - 7.43 pH    PCO2 Venous 23 (L) 40 - 50 mm Hg    PO2 Venous 44 25 - 47 mm Hg    Bicarbonate Venous 22 21 - 28 mmol/L    O2 Sat Venous 88 %    Lactic Acid 1.0 0.7 - 2.1 mmol/L       Veronica Cunningham PA-C

## 2017-09-27 NOTE — ED PROVIDER NOTES
"  History     Chief Complaint:  Cough      HPI   Lakisha Burroughs is a 81 year old female who presents with a cough. The patient states that she is currently undergoing treatment for recurrent breast cancer with chest metastasis for which she just finished her 10th round of radiation and should be starting chemo next week. She states that two days ago she began experiencing a productive cough, shortness of breath, congestion, stuffy nose, sinus pressure, and subjective fever. Patient denies leg swelling/pain or known ill contacts. Patient denies all other complaints.  She lives on her own in a 3 story house.  Her only family is her daughter.  She has been having difficulty ambulating on her own at home, does not have walker or wheelchair.  She feels diffusely weak and tired, but no focal neurologic changes.      Allergies:  Iodine      Medications:    The patient is not currently taking any prescribed medications.     Past Medical History:    Breast Cancer  Colon Cancer    Past Surgical History:    Breast Surgery  Orthopedic Surgery    Family History:    History reviewed. No pertinent family history.      Social History:  Presents with daughter   Tobacco use: never  Alcohol use: No  PCP: Kishore Valladares    Marital Status:        Review of Systems   HENT: Positive for congestion and sinus pressure.    Respiratory: Positive for cough and shortness of breath.    Cardiovascular: Negative for leg swelling.   All other systems reviewed and are negative.    Physical Exam     Patient Vitals for the past 24 hrs:   BP Temp Temp src Heart Rate Resp SpO2 Height Weight   09/26/17 2200 146/75 - - - - 99 % - -   09/26/17 2145 136/81 - - - - 98 % - -   09/26/17 2140 - - - - - 99 % - -   09/26/17 2045 129/76 - - - - 97 % - -   09/26/17 2020 148/90 98.5  F (36.9  C) Oral 101 20 97 % 1.803 m (5' 11\") 72.1 kg (159 lb)      Physical Exam  Constitutional: Alert, attentive, GCS 15, appears fatigued, with frequent wet cough   HENT: No " frontal or maxillary sinus pressure   Nose: Nose normal.    Mouth/Throat: Oropharynx is clear, mucous membranes are moist, uvula midline   Eyes: Normal conjunctiva. Pupils are equal, round, and reactive to light.   CV: tachycardic rate and rhythm; no murmurs, rubs or gallups  Chest: Effort normal and breath sounds normal,sternal chest wall tenderness  GI:  There is no tenderness. No distension. Normal bowel sounds  MSK: Normal range of motion, no calf swelling or tenderness  Neurological: Alert, attentive, oriented x4, moving all extremities equally   Skin: Skin is warm and dry.     Emergency Department Course   Imaging:  Radiographic findings were communicated with the patient and family who voiced understanding of the findings.    Chest XR, PA & LAT:   IMPRESSION: Mild elevation of the left hemidiaphragm is unchanged. The lungs are otherwise clear. No pleural effusions. Heart size and pulmonary vascularity are within normal limits.    Results per radiology.     Laboratory:  CBC: HGB 10.8 (L) o/w WNL (WBC 4.7, )    BMP: Glucose 134 (H), GFR 57 (L) o/w WNL (Creatinine 0.94)   ISTAT Gases: pH 7.59 (H), PCO2 23 (L) o/w WNL    Interventions:  2141:  mL IV Bolus   2211: Mucinex DM 12 hr Tablet PO    Emergency Department Course:  Past medical records, nursing notes, and vitals reviewed.  2101: I performed an exam of the patient and obtained history, as documented above.  IV inserted and blood drawn.   Above interventions provided.   The patient was sent for a chest xray while in the emergency department, findings above.   2157: I rechecked the patient and explained findings to her.   2220: I spoke with Dr. Valladares, from [facility], regarding this patient.   I personally reviewed the laboratory results with the Patient and daughter and answered all related questions prior to admission.  Findings and plan explained to the Patient and daughter who consents to admission.   2224: Discussed the  patient with Dr. Stephens, who will admit the patient to an observation bed for further monitoring, evaluation, and treatment.        Impression & Plan    Medical Decision Making:  Lakisha Burroughs is a 81 year old female who presents for evaluation of productive cough, shortness of breath, and congestion.  CXR does not show infiltrate to suggest pneumonia and labs reassuring including normal lactate.  This is consistent with an upper respiratory tract infection, likely briseyda..  There is no signs at this point of serious bacterial infection such as OM, RPA, epiglottitis, PTA, strep pharyngitis, pneumonia, sinusitis, meningitis, bacteremia, serious bacterial infection.  Considered PE as she has active cancer, but symptoms sound consistent with URI.  Patient has been very weak, fatigued, and having difficulty caring for her self at home (lives in 3 story house on her own) since getting her radiation therapy.  Her daughter states she is having difficulty ambulating safely and is concerned she will fall and not be able to take care of herself.  This was discussed with her oncology physician Dr. Gomes and the on call hospitalist. She does not meet inpatient criteria, but patient and her daughter are interested in observation admission for symptomatic treatment and possibly arrangement of home health care if able to go home vs. SNF placement.        Diagnosis:    ICD-10-CM   1. Upper respiratory tract infection, unspecified type J06.9   2. Generalized muscle weakness M62.81       Disposition:  Admitted to observation bed.       9/26/2017   [facility] EMERGENCY DEPARTMENT  Mary Kay PEREZ am serving as a scribe at 9:01 PM on 9/26/2017 to document services personally performed by Kasandra Babcock MD based on my observations and the provider's statements to me.       Kasandra Babcock MD  09/26/17 0554       Kasandra Babcock MD  09/26/17 1509

## 2017-09-27 NOTE — ED NOTES
..  [facility]  ED Nurse Handoff Report    Lakisha Burroughs is a 81 year old female   ED Chief complaint: Cough  . ED Diagnosis:   Final diagnoses:   Upper respiratory tract infection, unspecified type   Generalized muscle weakness     Allergies:   Allergies   Allergen Reactions     Iodine Hives     Had iodine allergy 30 yrs ago gets premeds prior to tests. Unsure what the allergy was, Unknown any further details       Code Status: Full Code  Activity level - Baseline/Home:  Independent. Activity Level - Current:   Stand with Assist. Lift room needed: No. Bariatric: No   Needed: No   Isolation: No. Infection: Not Applicable.     Vital Signs:   Vitals:    09/26/17 2045 09/26/17 2140 09/26/17 2145 09/26/17 2200   BP: 129/76  136/81 146/75   Resp:       Temp:       TempSrc:       SpO2: 97% 99% 98% 99%   Weight:       Height:           Cardiac Rhythm:  ,      Pain level:    Patient confused: No. Patient Falls Risk: Yes.   Elimination Status: Has voided   Patient Report - Initial Complaint: cough, congestion. Focused Assessment: A&Ox4. ABC's intact. Pt c/o cough and congestion, pain.  Known sternal fx r/t radiation.  Hx of breast CA with ROWENA. Pain 5/10 at this time.     Tests Performed: lab, xray. Abnormal Results: ..  Labs Ordered and Resulted from Time of ED Arrival Up to the Time of Departure from the ED   CBC WITH PLATELETS DIFFERENTIAL - Abnormal; Notable for the following:        Result Value    RBC Count 3.43 (*)     Hemoglobin 10.8 (*)     Hematocrit 31.6 (*)     Absolute Lymphocytes 0.5 (*)     All other components within normal limits   BASIC METABOLIC PANEL - Abnormal; Notable for the following:     Glucose 134 (*)     GFR Estimate 57 (*)     All other components within normal limits   ISTAT  GASES LACTATE DAVIDA POCT - Abnormal; Notable for the following:     Ph Venous 7.59 (*)     PCO2 Venous 23 (*)     All other components within normal limits   ISTAT CG4 GASES LACTATE DAVIDA NURSING POCT      ..  XR Chest 2 Views   Final Result   IMPRESSION: Mild elevation of the left hemidiaphragm is unchanged. The   lungs are otherwise clear. No pleural effusions. Heart size and   pulmonary vascularity are within normal limits.      PRO HENRIQUEZ MD         Treatments provided: fluids, mucinex DM  Family Comments: Daughter at bedside  OBS brochure/video discussed/provided to patient:  Yes  ED Medications:   Medications   0.9% sodium chloride BOLUS (500 mLs Intravenous New Bag 9/26/17 2141)   dextromethorphan-guaiFENesin (MUCINEX DM)  MG per 12 hr tablet 1 tablet (1 tablet Oral Given 9/26/17 2211)     Drips infusing:  No  For the majority of the shift, the patient's behavior Green. Interventions performed were NA.     Severe Sepsis OR Septic Shock Diagnosis Present: No      ED Nurse Name/Phone Number: Mariam Davidson,   10:28 PM    RECEIVING UNIT ED HANDOFF REVIEW    Above ED Nurse Handoff Report was reviewed: Yes  Reviewed by: Mariam Anand on September 26, 2017 at 10:44 PM

## 2017-09-27 NOTE — PLAN OF CARE
Problem: Patient Care Overview  Goal: Plan of Care/Patient Progress Review  PRIMARY DIAGNOSIS: Upper Respiratory Infection  OUTPATIENT/OBSERVATION GOALS TO BE MET BEFORE DISCHARGE:  1. ADLs back to baseline: No      2. Activity and level of assistance: Up with standby assistance d/t increased weakness.       3. Pain status: Lidoderm patch in place.      4. Return to near baseline physical activity: No, up SBA. Pt independent at baseline.          Discharge Planner Nurse   Safe discharge environment identified: No, pt lives independently. May need increased assistance.   Barriers to discharge: No       Entered by: Mariam Anand 09/27/2017 4:04 AM     Please review provider order for any additional goals.   Nurse to notify provider when observation goals have been met and patient is ready for discharge.    Pt A/O, VSS. Up with SBA. Pt pain 5/10 with cough in sternal area. Will continue to monitor and provide supportive cares.

## 2017-09-27 NOTE — ED NOTES
A&Ox4. ABC's intact. Pt c/o cough and congestion for 2 days, increasing at night. Hx of Sternal Fx from Radiation.  Pain 5/10 at this time.

## 2018-03-12 ENCOUNTER — HOSPITAL ENCOUNTER (OUTPATIENT)
Dept: CARDIOLOGY | Facility: CLINIC | Age: 83
Discharge: HOME | End: 2018-03-12
Attending: NURSE PRACTITIONER | Admitting: NURSE PRACTITIONER
Payer: MEDICARE

## 2018-03-12 DIAGNOSIS — C50.812 MALIGNANT NEOPLASM OF OVERLAPPING SITES OF LEFT FEMALE BREAST, UNSPECIFIED ESTROGEN RECEPTOR STATUS (H): ICD-10-CM

## 2018-03-12 DIAGNOSIS — C50.412 MALIGNANT NEOPLASM OF UPPER-OUTER QUADRANT OF LEFT FEMALE BREAST, UNSPECIFIED ESTROGEN RECEPTOR STATUS (H): ICD-10-CM

## 2018-03-12 DIAGNOSIS — Z51.11 MAINTENANCE CHEMOTHERAPY: ICD-10-CM

## 2018-03-12 PROCEDURE — 93306 TTE W/DOPPLER COMPLETE: CPT

## 2018-03-12 PROCEDURE — 93306 TTE W/DOPPLER COMPLETE: CPT | Mod: 26 | Performed by: INTERNAL MEDICINE

## 2018-03-17 ENCOUNTER — APPOINTMENT (OUTPATIENT)
Dept: CT IMAGING | Facility: CLINIC | Age: 83
DRG: 193 | End: 2018-03-17
Attending: EMERGENCY MEDICINE
Payer: MEDICARE

## 2018-03-17 ENCOUNTER — APPOINTMENT (OUTPATIENT)
Dept: GENERAL RADIOLOGY | Facility: CLINIC | Age: 83
DRG: 193 | End: 2018-03-17
Attending: EMERGENCY MEDICINE
Payer: MEDICARE

## 2018-03-17 ENCOUNTER — HOSPITAL ENCOUNTER (INPATIENT)
Facility: CLINIC | Age: 83
LOS: 4 days | Discharge: HOME | DRG: 193 | End: 2018-03-21
Attending: EMERGENCY MEDICINE | Admitting: INTERNAL MEDICINE
Payer: MEDICARE

## 2018-03-17 ENCOUNTER — APPOINTMENT (OUTPATIENT)
Dept: ULTRASOUND IMAGING | Facility: CLINIC | Age: 83
DRG: 193 | End: 2018-03-17
Attending: EMERGENCY MEDICINE
Payer: MEDICARE

## 2018-03-17 DIAGNOSIS — J18.9 COMMUNITY ACQUIRED PNEUMONIA OF RIGHT MIDDLE LOBE OF LUNG: Primary | ICD-10-CM

## 2018-03-17 DIAGNOSIS — R09.02 HYPOXIA: ICD-10-CM

## 2018-03-17 DIAGNOSIS — R30.0 DYSURIA: ICD-10-CM

## 2018-03-17 PROBLEM — R53.1 WEAKNESS: Status: ACTIVE | Noted: 2018-03-17

## 2018-03-17 LAB
ALBUMIN SERPL-MCNC: 3.4 G/DL (ref 3.4–5)
ALBUMIN UR-MCNC: 10 MG/DL
ALP SERPL-CCNC: 70 U/L (ref 40–150)
ALT SERPL W P-5'-P-CCNC: 23 U/L (ref 0–50)
ANION GAP SERPL CALCULATED.3IONS-SCNC: 8 MMOL/L (ref 3–14)
APPEARANCE UR: CLEAR
AST SERPL W P-5'-P-CCNC: 37 U/L (ref 0–45)
BACTERIA #/AREA URNS HPF: ABNORMAL /HPF
BASOPHILS # BLD AUTO: 0 10E9/L (ref 0–0.2)
BASOPHILS NFR BLD AUTO: 0.2 %
BILIRUB SERPL-MCNC: 0.8 MG/DL (ref 0.2–1.3)
BILIRUB UR QL STRIP: NEGATIVE
BUN SERPL-MCNC: 15 MG/DL (ref 7–30)
CALCIUM SERPL-MCNC: 8.6 MG/DL (ref 8.5–10.1)
CHLORIDE SERPL-SCNC: 100 MMOL/L (ref 94–109)
CO2 SERPL-SCNC: 25 MMOL/L (ref 20–32)
COLOR UR AUTO: YELLOW
CREAT SERPL-MCNC: 0.78 MG/DL (ref 0.52–1.04)
CREAT SERPL-MCNC: 0.78 MG/DL (ref 0.52–1.04)
DIFFERENTIAL METHOD BLD: ABNORMAL
EOSINOPHIL # BLD AUTO: 0 10E9/L (ref 0–0.7)
EOSINOPHIL NFR BLD AUTO: 0.2 %
ERYTHROCYTE [DISTWIDTH] IN BLOOD BY AUTOMATED COUNT: 15.1 % (ref 10–15)
FLUAV+FLUBV AG SPEC QL: NEGATIVE
FLUAV+FLUBV AG SPEC QL: NEGATIVE
GFR SERPL CREATININE-BSD FRML MDRD: 70 ML/MIN/1.7M2
GFR SERPL CREATININE-BSD FRML MDRD: 71 ML/MIN/1.7M2
GLUCOSE SERPL-MCNC: 146 MG/DL (ref 70–99)
GLUCOSE UR STRIP-MCNC: NEGATIVE MG/DL
HCT VFR BLD AUTO: 29.6 % (ref 35–47)
HGB BLD-MCNC: 9.9 G/DL (ref 11.7–15.7)
HGB UR QL STRIP: NEGATIVE
HYALINE CASTS #/AREA URNS LPF: 4 /LPF (ref 0–2)
IMM GRANULOCYTES # BLD: 0 10E9/L (ref 0–0.4)
IMM GRANULOCYTES NFR BLD: 0.2 %
KETONES UR STRIP-MCNC: NEGATIVE MG/DL
LEUKOCYTE ESTERASE UR QL STRIP: ABNORMAL
LIPASE SERPL-CCNC: 197 U/L (ref 73–393)
LYMPHOCYTES # BLD AUTO: 0.4 10E9/L (ref 0.8–5.3)
LYMPHOCYTES NFR BLD AUTO: 5.9 %
MCH RBC QN AUTO: 32.6 PG (ref 26.5–33)
MCHC RBC AUTO-ENTMCNC: 33.4 G/DL (ref 31.5–36.5)
MCV RBC AUTO: 97 FL (ref 78–100)
MONOCYTES # BLD AUTO: 0.3 10E9/L (ref 0–1.3)
MONOCYTES NFR BLD AUTO: 4.6 %
MUCOUS THREADS #/AREA URNS LPF: PRESENT /LPF
NEUTROPHILS # BLD AUTO: 5.6 10E9/L (ref 1.6–8.3)
NEUTROPHILS NFR BLD AUTO: 88.9 %
NITRATE UR QL: NEGATIVE
NRBC # BLD AUTO: 0 10*3/UL
NRBC BLD AUTO-RTO: 0 /100
PH UR STRIP: 6.5 PH (ref 5–7)
PLATELET # BLD AUTO: 210 10E9/L (ref 150–450)
PLATELET # BLD AUTO: 236 10E9/L (ref 150–450)
POTASSIUM SERPL-SCNC: 4.3 MMOL/L (ref 3.4–5.3)
PROT SERPL-MCNC: 7.4 G/DL (ref 6.8–8.8)
RBC # BLD AUTO: 3.04 10E12/L (ref 3.8–5.2)
RBC #/AREA URNS AUTO: 2 /HPF (ref 0–2)
SODIUM SERPL-SCNC: 133 MMOL/L (ref 133–144)
SOURCE: ABNORMAL
SP GR UR STRIP: 1.02 (ref 1–1.03)
SPECIMEN SOURCE: NORMAL
SQUAMOUS #/AREA URNS AUTO: 1 /HPF (ref 0–1)
TROPONIN I SERPL-MCNC: <0.015 UG/L (ref 0–0.04)
TSH SERPL DL<=0.005 MIU/L-ACNC: 0.85 MU/L (ref 0.4–4)
UROBILINOGEN UR STRIP-MCNC: NORMAL MG/DL (ref 0–2)
WBC # BLD AUTO: 6.3 10E9/L (ref 4–11)
WBC #/AREA URNS AUTO: 27 /HPF (ref 0–5)

## 2018-03-17 PROCEDURE — 87088 URINE BACTERIA CULTURE: CPT | Performed by: EMERGENCY MEDICINE

## 2018-03-17 PROCEDURE — 84484 ASSAY OF TROPONIN QUANT: CPT | Performed by: EMERGENCY MEDICINE

## 2018-03-17 PROCEDURE — 25000128 H RX IP 250 OP 636: Performed by: EMERGENCY MEDICINE

## 2018-03-17 PROCEDURE — 85049 AUTOMATED PLATELET COUNT: CPT | Performed by: INTERNAL MEDICINE

## 2018-03-17 PROCEDURE — 27210995 ZZH RX 272: Performed by: EMERGENCY MEDICINE

## 2018-03-17 PROCEDURE — 87086 URINE CULTURE/COLONY COUNT: CPT | Performed by: EMERGENCY MEDICINE

## 2018-03-17 PROCEDURE — 36415 COLL VENOUS BLD VENIPUNCTURE: CPT | Performed by: INTERNAL MEDICINE

## 2018-03-17 PROCEDURE — 93970 EXTREMITY STUDY: CPT

## 2018-03-17 PROCEDURE — 93005 ELECTROCARDIOGRAM TRACING: CPT

## 2018-03-17 PROCEDURE — 87804 INFLUENZA ASSAY W/OPTIC: CPT | Performed by: EMERGENCY MEDICINE

## 2018-03-17 PROCEDURE — 96375 TX/PRO/DX INJ NEW DRUG ADDON: CPT

## 2018-03-17 PROCEDURE — 71260 CT THORAX DX C+: CPT

## 2018-03-17 PROCEDURE — 99223 1ST HOSP IP/OBS HIGH 75: CPT | Mod: AI | Performed by: INTERNAL MEDICINE

## 2018-03-17 PROCEDURE — 81001 URINALYSIS AUTO W/SCOPE: CPT | Performed by: EMERGENCY MEDICINE

## 2018-03-17 PROCEDURE — 87186 SC STD MICRODIL/AGAR DIL: CPT | Performed by: EMERGENCY MEDICINE

## 2018-03-17 PROCEDURE — 85025 COMPLETE CBC W/AUTO DIFF WBC: CPT | Performed by: EMERGENCY MEDICINE

## 2018-03-17 PROCEDURE — 25000128 H RX IP 250 OP 636: Performed by: INTERNAL MEDICINE

## 2018-03-17 PROCEDURE — 83690 ASSAY OF LIPASE: CPT | Performed by: EMERGENCY MEDICINE

## 2018-03-17 PROCEDURE — 87040 BLOOD CULTURE FOR BACTERIA: CPT | Performed by: INTERNAL MEDICINE

## 2018-03-17 PROCEDURE — 99285 EMERGENCY DEPT VISIT HI MDM: CPT | Mod: 25

## 2018-03-17 PROCEDURE — 25000132 ZZH RX MED GY IP 250 OP 250 PS 637: Mod: GY | Performed by: INTERNAL MEDICINE

## 2018-03-17 PROCEDURE — 71046 X-RAY EXAM CHEST 2 VIEWS: CPT

## 2018-03-17 PROCEDURE — A9270 NON-COVERED ITEM OR SERVICE: HCPCS | Mod: GY | Performed by: INTERNAL MEDICINE

## 2018-03-17 PROCEDURE — 82565 ASSAY OF CREATININE: CPT | Performed by: INTERNAL MEDICINE

## 2018-03-17 PROCEDURE — 80053 COMPREHEN METABOLIC PANEL: CPT | Performed by: EMERGENCY MEDICINE

## 2018-03-17 PROCEDURE — 27210995 ZZH RX 272: Performed by: INTERNAL MEDICINE

## 2018-03-17 PROCEDURE — 96374 THER/PROPH/DIAG INJ IV PUSH: CPT | Mod: 59

## 2018-03-17 PROCEDURE — 70450 CT HEAD/BRAIN W/O DYE: CPT

## 2018-03-17 PROCEDURE — 25000125 ZZHC RX 250: Performed by: EMERGENCY MEDICINE

## 2018-03-17 PROCEDURE — 12000007 ZZH R&B INTERMEDIATE

## 2018-03-17 PROCEDURE — 84443 ASSAY THYROID STIM HORMONE: CPT | Performed by: EMERGENCY MEDICINE

## 2018-03-17 RX ORDER — AMOXICILLIN 250 MG
1 CAPSULE ORAL 2 TIMES DAILY PRN
Status: DISCONTINUED | OUTPATIENT
Start: 2018-03-17 | End: 2018-03-21 | Stop reason: HOSPADM

## 2018-03-17 RX ORDER — ALBUTEROL SULFATE 5 MG/ML
2.5 SOLUTION RESPIRATORY (INHALATION)
Status: DISCONTINUED | OUTPATIENT
Start: 2018-03-17 | End: 2018-03-21 | Stop reason: HOSPADM

## 2018-03-17 RX ORDER — SODIUM CHLORIDE 9 MG/ML
1000 INJECTION, SOLUTION INTRAVENOUS CONTINUOUS
Status: DISCONTINUED | OUTPATIENT
Start: 2018-03-17 | End: 2018-03-17

## 2018-03-17 RX ORDER — POLYETHYLENE GLYCOL 3350 17 G/17G
17 POWDER, FOR SOLUTION ORAL DAILY PRN
Status: DISCONTINUED | OUTPATIENT
Start: 2018-03-17 | End: 2018-03-21 | Stop reason: HOSPADM

## 2018-03-17 RX ORDER — ONDANSETRON 2 MG/ML
4 INJECTION INTRAMUSCULAR; INTRAVENOUS EVERY 6 HOURS PRN
Status: DISCONTINUED | OUTPATIENT
Start: 2018-03-17 | End: 2018-03-21 | Stop reason: HOSPADM

## 2018-03-17 RX ORDER — PROCHLORPERAZINE 25 MG
12.5 SUPPOSITORY, RECTAL RECTAL EVERY 12 HOURS PRN
Status: DISCONTINUED | OUTPATIENT
Start: 2018-03-17 | End: 2018-03-21 | Stop reason: HOSPADM

## 2018-03-17 RX ORDER — ONDANSETRON 4 MG/1
4 TABLET, ORALLY DISINTEGRATING ORAL EVERY 6 HOURS PRN
Status: DISCONTINUED | OUTPATIENT
Start: 2018-03-17 | End: 2018-03-21 | Stop reason: HOSPADM

## 2018-03-17 RX ORDER — DIPHENHYDRAMINE HYDROCHLORIDE 50 MG/ML
25 INJECTION INTRAMUSCULAR; INTRAVENOUS ONCE
Status: COMPLETED | OUTPATIENT
Start: 2018-03-17 | End: 2018-03-17

## 2018-03-17 RX ORDER — GUAIFENESIN 600 MG/1
600 TABLET, EXTENDED RELEASE ORAL 2 TIMES DAILY PRN
Status: DISCONTINUED | OUTPATIENT
Start: 2018-03-17 | End: 2018-03-21 | Stop reason: HOSPADM

## 2018-03-17 RX ORDER — METHYLPREDNISOLONE SODIUM SUCCINATE 125 MG/2ML
125 INJECTION, POWDER, LYOPHILIZED, FOR SOLUTION INTRAMUSCULAR; INTRAVENOUS ONCE
Status: COMPLETED | OUTPATIENT
Start: 2018-03-17 | End: 2018-03-17

## 2018-03-17 RX ORDER — AMOXICILLIN 250 MG
2 CAPSULE ORAL 2 TIMES DAILY PRN
Status: DISCONTINUED | OUTPATIENT
Start: 2018-03-17 | End: 2018-03-21 | Stop reason: HOSPADM

## 2018-03-17 RX ORDER — MAGNESIUM SULFATE HEPTAHYDRATE 40 MG/ML
4 INJECTION, SOLUTION INTRAVENOUS EVERY 4 HOURS PRN
Status: DISCONTINUED | OUTPATIENT
Start: 2018-03-17 | End: 2018-03-21 | Stop reason: HOSPADM

## 2018-03-17 RX ORDER — OXYCODONE HYDROCHLORIDE 5 MG/1
5-10 TABLET ORAL
Status: DISCONTINUED | OUTPATIENT
Start: 2018-03-17 | End: 2018-03-21 | Stop reason: HOSPADM

## 2018-03-17 RX ORDER — POTASSIUM CHLORIDE 1.5 G/1.58G
20-40 POWDER, FOR SOLUTION ORAL
Status: DISCONTINUED | OUTPATIENT
Start: 2018-03-17 | End: 2018-03-21 | Stop reason: HOSPADM

## 2018-03-17 RX ORDER — SODIUM CHLORIDE 9 MG/ML
INJECTION, SOLUTION INTRAVENOUS CONTINUOUS
Status: DISCONTINUED | OUTPATIENT
Start: 2018-03-17 | End: 2018-03-19

## 2018-03-17 RX ORDER — NALOXONE HYDROCHLORIDE 0.4 MG/ML
.1-.4 INJECTION, SOLUTION INTRAMUSCULAR; INTRAVENOUS; SUBCUTANEOUS
Status: DISCONTINUED | OUTPATIENT
Start: 2018-03-17 | End: 2018-03-21 | Stop reason: HOSPADM

## 2018-03-17 RX ORDER — ZOLPIDEM TARTRATE 5 MG/1
5 TABLET ORAL
Status: DISCONTINUED | OUTPATIENT
Start: 2018-03-17 | End: 2018-03-21 | Stop reason: DRUGHIGH

## 2018-03-17 RX ORDER — PROCHLORPERAZINE MALEATE 5 MG
5 TABLET ORAL EVERY 6 HOURS PRN
Status: DISCONTINUED | OUTPATIENT
Start: 2018-03-17 | End: 2018-03-21 | Stop reason: HOSPADM

## 2018-03-17 RX ORDER — POTASSIUM CHLORIDE 7.45 MG/ML
10 INJECTION INTRAVENOUS
Status: DISCONTINUED | OUTPATIENT
Start: 2018-03-17 | End: 2018-03-21 | Stop reason: HOSPADM

## 2018-03-17 RX ORDER — POTASSIUM CHLORIDE 29.8 MG/ML
20 INJECTION INTRAVENOUS
Status: DISCONTINUED | OUTPATIENT
Start: 2018-03-17 | End: 2018-03-21 | Stop reason: HOSPADM

## 2018-03-17 RX ORDER — IOPAMIDOL 755 MG/ML
59 INJECTION, SOLUTION INTRAVASCULAR ONCE
Status: COMPLETED | OUTPATIENT
Start: 2018-03-17 | End: 2018-03-17

## 2018-03-17 RX ORDER — POTASSIUM CL/LIDO/0.9 % NACL 10MEQ/0.1L
10 INTRAVENOUS SOLUTION, PIGGYBACK (ML) INTRAVENOUS
Status: DISCONTINUED | OUTPATIENT
Start: 2018-03-17 | End: 2018-03-21 | Stop reason: HOSPADM

## 2018-03-17 RX ORDER — POTASSIUM CHLORIDE 1500 MG/1
20-40 TABLET, EXTENDED RELEASE ORAL
Status: DISCONTINUED | OUTPATIENT
Start: 2018-03-17 | End: 2018-03-21 | Stop reason: HOSPADM

## 2018-03-17 RX ORDER — ACETAMINOPHEN 500 MG
1000 TABLET ORAL 3 TIMES DAILY
Status: DISCONTINUED | OUTPATIENT
Start: 2018-03-17 | End: 2018-03-21 | Stop reason: HOSPADM

## 2018-03-17 RX ORDER — DIPHENHYDRAMINE HCL 25 MG
25 CAPSULE ORAL
Status: DISCONTINUED | OUTPATIENT
Start: 2018-03-17 | End: 2018-03-21 | Stop reason: HOSPADM

## 2018-03-17 RX ADMIN — CEFTRIAXONE SODIUM 1 G: 10 INJECTION, POWDER, FOR SOLUTION INTRAVENOUS at 15:45

## 2018-03-17 RX ADMIN — SODIUM CHLORIDE 1000 ML: 9 INJECTION, SOLUTION INTRAVENOUS at 12:41

## 2018-03-17 RX ADMIN — METHYLPREDNISOLONE SODIUM SUCCINATE 125 MG: 125 INJECTION, POWDER, FOR SOLUTION INTRAMUSCULAR; INTRAVENOUS at 16:57

## 2018-03-17 RX ADMIN — DIPHENHYDRAMINE HYDROCHLORIDE 25 MG: 50 INJECTION, SOLUTION INTRAMUSCULAR; INTRAVENOUS at 16:57

## 2018-03-17 RX ADMIN — CEFTRIAXONE SODIUM 1 G: 10 INJECTION, POWDER, FOR SOLUTION INTRAVENOUS at 18:43

## 2018-03-17 RX ADMIN — ENOXAPARIN SODIUM 40 MG: 40 INJECTION SUBCUTANEOUS at 18:45

## 2018-03-17 RX ADMIN — ACETAMINOPHEN 1000 MG: 500 TABLET, FILM COATED ORAL at 18:46

## 2018-03-17 RX ADMIN — AZITHROMYCIN MONOHYDRATE 500 MG: 500 INJECTION, POWDER, LYOPHILIZED, FOR SOLUTION INTRAVENOUS at 18:42

## 2018-03-17 RX ADMIN — IOPAMIDOL 59 ML: 755 INJECTION, SOLUTION INTRAVENOUS at 17:15

## 2018-03-17 RX ADMIN — SODIUM CHLORIDE 86 ML: 9 INJECTION, SOLUTION INTRAVENOUS at 17:15

## 2018-03-17 RX ADMIN — ZOLPIDEM TARTRATE 5 MG: 5 TABLET, FILM COATED ORAL at 22:30

## 2018-03-17 RX ADMIN — SODIUM CHLORIDE: 9 INJECTION, SOLUTION INTRAVENOUS at 22:26

## 2018-03-17 RX ADMIN — CHOLECALCIFEROL TAB 25 MCG (1000 UNIT) 1000 UNITS: 25 TAB at 18:46

## 2018-03-17 ASSESSMENT — ENCOUNTER SYMPTOMS
VOMITING: 0
COUGH: 1
CONSTIPATION: 1
WEAKNESS: 1
DIARRHEA: 1
HEADACHES: 0
SHORTNESS OF BREATH: 0
NAUSEA: 1
DYSURIA: 0

## 2018-03-17 NOTE — ED NOTES
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 Needing to sit down witnesses stated that she wasn't making sense with her speech.  Had chemo Tx on Thursday.

## 2018-03-17 NOTE — ED NOTES
Bed: ED24  Expected date:   Expected time:   Means of arrival:   Comments:  [facility] syncope 82 female

## 2018-03-17 NOTE — H&P
Admitted:     03/17/2018      PRIMARY CARE PROVIDER:  Dr. Kishore Valladares, oncologist at [facility].      CHIEF COMPLAINT:  Generalized weakness.      HISTORY OF PRESENT ILLNESS:  Ms. Lakisha Burroughs is a delightful 82-year-old  lady with a past medical history notable for colon cancer, status post right colectomy as well as chemotherapy in the 1970s, and metastatic breast cancer, currently undergoing chemotherapy with Dr. Kishore Valladares, who has presented to the emergency department for evaluation of generalized weakness.  The history was obtained from the patient as well as her daughter, who was present in the room at bedside during the interview.  Historically, she was diagnosed with left breast cancer in 2009, treated with mastectomy, but no chemoradiation therapy.  In 08/2017, she was noted to have metastatic disease involving the sternum, para-aortic, retrocrural and mesenteric lymph nodes.  Therefore, she underwent radiation therapy for 8 weeks in September and October.  Subsequently, in December of last year she was started on chemotherapy with a regimen including Taxol, Herceptin and Zometa.  Her last chemotherapy was two days ago onThursday, 03/15/2018.  According to daughter, since the initiation of chemotherapy in Dec. 2017, the patient has developed progressive weakness.  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  There was no loss of consciousness or associated focal weakness or paresthesia.  There was no seizure activity noted.  She reports this is similar to how she feels when she takes Benadryl.  Reportedly, she has developed a dry cough for the last several months.  She also complains of dyspnea for 1-2 weeks.  She reports no fever, chills, chest pain, nausea, vomiting or urinary symptoms.  Because of the chemotherapy, she has alternating diarrhea and constipation.  In the emergency department, she has been evaluated by  the ER attending physician.  In the ER, her oxygen saturation dipped to 88%.  Therefore, she is currently on 2 liters of nasal cannula oxygen.  She has otherwise stable vital signs with a temperature of 98.3 degrees Fahrenheit.  Hematology profile is significant for a hemoglobin of 9.9.  The last available hemoglobin in EPIC was 10.8 in 2017.  She has a normal white count of 6.3.  Urinalysis shows clear urine, negative nitrites, but is positive for moderate leukocyte esterase, 27 WBCs and a few bacteria.  Screening for influenza is negative.  The chest x-ray shows no active cardiopulmonary disease.  CT scan of the head without contrast reveals diffuse cerebral volume loss and cerebral white matter changes consistent with chronic small vessel ischemic disease.  There is no evidence for acute intracranial pathology.  She is being admitted to the hospital under inpatient status.  Of note, for the past week, she has developed a left lower extremity edema.  In the ER, the patient has been treated with 1 gram of IV ceftriaxone and 1000 mg of IV normal saline bolus.      PAST MEDICAL HISTORY:   1.  Colon cancer diagnosed in , status post right colon resection and chemotherapy.   2.  Left breast cancer diagnosed in , treated with mastectomy.  In 2017, she was diagnosed with metastatic disease involving the sternum, retrocrural, para-aortic and mesenteric lymph nodes.  She underwent 8 weeks of radiation therapy in 2017 and 10/2017, followed by chemotherapy with Taxol, Herceptin and Zometa from 2017.  Last chemotherapy was on 03/15/2018.  She follows with Dr. Kishore Valladares of [facility].   3.  Anemia, chronic, due to metastatic breast cancer as well as chemotherapy. Last Hgb 1.08 in 2017.     PAST SURGICAL HISTORY:   1.  Right colon resection in  for colon cancer.   2.  Left mastectomy in 2009 for breast cancer.     3.  Right knee surgery.   4. Appendectomy.     FAMILY HISTORY:  Father  of lung  cancer at the age of 77.      SOCIAL HISTORY:  The patient is  and lives alone.  She does not use any assistive devices for ambulation.  She denies smoking or drinking alcohol.      MEDICATIONS:    Prior to Admission Medications   Prescriptions Last Dose Informant Patient Reported? Taking?   Cyanocobalamin (VITAMIN B 12 PO) 3/17/2018 at am Daughter Yes Yes   Sig: Take 1 tablet by mouth daily Chewable tablet   DiphenhydrAMINE HCl (BENADRYL PO) prn Daughter Yes Yes   Sig: Take 25 mg by mouth nightly as needed   Multiple Vitamins-Minerals (CENTRUM SILVER) per tablet 3/17/2018 at am Daughter Yes Yes   Sig: Take 1 tablet by mouth daily   Naproxen Sodium (ALEVE PO) prn Daughter Yes Yes   Sig: Take 220 mg by mouth daily as needed for moderate pain   VITAMIN D, CHOLECALCIFEROL, PO 3/17/2018 at am Daughter Yes Yes   Sig: Take 1,000 Units by mouth daily   ZOLPIDEM TARTRATE PO Past Month at Unknown time Daughter Yes Yes   Sig: Take 5 mg by mouth nightly as needed for sleep   acetaminophen (TYLENOL) 500 MG tablet prn Daughter No Yes   Sig: Take 2 tablets (1,000 mg) by mouth 3 times daily   calcium carbonate-vitamin D 600-400 MG-UNIT CHEW 3/17/2018 at am Daughter Yes Yes   Sig: Take 1 tablet by mouth daily      Facility-Administered Medications: None      ALLERGIES AND  INTOLERANCES:  IODINE CAUSES HIVES.      REVIEW OF SYSTEMS:  A 10-point review of system was performed thoroughly and was negative except as stated in history of present illness.  Additionally, she reports no melena or hematochezia.      PHYSICAL EXAMINATION:   GENERAL:  This patient is a very pleasant but weak-appearing elderly lady who is in no acute distress.   VITAL SIGNS:  Blood pressure 137/67, heart rate 89, respiratory rate 20, temperature 98.3 degrees Fahrenheit, oxygen saturation 95% initially on room air, then dipped to 88%, requiring treatment with 2 liters of oxygen through nasal cannula.   HEENT:  The pupils are round, equal, reactive to  light bilaterally.  There is mild conjunctival pallor.  The sclerae are anicteric.  The oral mucosa appears dry.   NECK:  Supple.  There is no elevated JVP.   LUNGS:  Clear to auscultation bilaterally.  No crackles, wheezing or rhonchi.   CARDIOVASCULAR:  There is normal S1 and S2 with regular rate and rhythm.  No S3, S4 or murmur is audible.   ABDOMEN:  Soft, nontender, nondistended.  Bowel sounds are present.   EXTREMITIES:  There is no joint swelling or calf tenderness.  There is bilateral lower extremity edema, left much greater than right side.   NEUROLOGIC:  She is awake, alert, oriented x 3.  There is no facial asymmetry.  Her speech is normal.  There is no focal deficit on gross examination.   SKIN:  There is no jaundice, cyanosis or acute rash.      LABORATORY DATA:   Chemistry profile:  Sodium 133, potassium 4.3, BUN 15, creatinine 0.78, calcium 8.6, total protein 7.4, ALT 23, AST 37.  Lipase 197.  Troponin I less than 0.015.  TSH is 0.85.  Glucose 146.      Hematology profile:  White count 6.3, hemoglobin 9.9, platelet count 236,000, MCV 97.      Urinalysis shows clear urine, moderate leukocyte esterase, 27 WBCs and few bacteria.      Chest x-ray shows no active cardiopulmonary disease.      CT head without contrast shows no acute intracranial pathology.      ASSESSMENT AND PLAN:  Ms. Lakisha Burroughs is a delightful 82-year-old  lady with a past medical history notable for colon cancer and metastatic breast cancer who has presented to the emergency department at [facility] for evaluation of generalized weakness and what appears to be near-syncope.  In the ER, she was noted to be hypoxic, having abnormal UA and left lower extremity edema.  She is admitted under inpatient status.   1.  Generalized weakness/near-syncope:  Patient has presented with progressive generalized weakness since the start of chemotherapy in 12/2017.  Earlier she also had an episode of sudden weakness along with  trouble with word finding and a fixated gaze with no true loss of consciousness, suggestive of near-syncope. The patient has normal findings on chemistry profile, including liver function panel.  Troponin I is less than 0.015 and TSH is normal at 0.85.  CT scan of the head without contrast shows no acute intracranial pathology. On examination, the patient is moderately volume depleted, which could be contributing to her near-syncope.  Her generalized weakness is multifactorial due to metastatic cancer, recent chemotherapy, dehydration, and infection.  The patient was treated with 1 liter of intravenous fluid in the emergency department.  I will continue fluid resuscitation with normal saline at rate of 100 mL per hour.  I will order fall precautions.  Further management for abnormal UA and hypoxia as below.    2.  Abnormal UA:  Urinalysis shows clear urine, but moderate leukocyte esterase, 27 WBCs and a few bacteria.  The patient has no fever, leukocytosis, or urinary symptoms.  Due to recent chemotherapy, I am inclined toward treating the patient empirically with intravenous ceftriaxone until we have the results of urine culture available.  In addition, I will obtain blood cultures.   3.  Hypoxia:  In the ER, the patient was initially saturating 95% on room air, then dipped to 88%, requiring treatment with 2 liters of oxygen.  The patient does report a dry cough for approximately 7 months and recent shortness of breath for 1 or 2 weeks.  Chest x-ray shows no active cardiopulmonary disease.  The patient is afebrile and has normal white count of 6.3 on hematology profile.  The differential diagnosis is broad and includes pneumonia, pulmonary embolism, radiation pneumonitis, etc.  I will obtain Dopplers of lower extremity as well as CT scan of the chest.  I will continue with O2 supplement.  I will have albuterol nebs available p.r.n.  I will check a procalcitonin level.   4.  Hyperglycemia:  The blood glucose is 146  and elevated.  I will check hemoglobin A1c to rule out prediabetes.   5.  Metastatic breast cancer:  She has known history of left breast cancer diagnosed in .  In August of last year, she was noted to have metastatic cancer.  She is currently undergoing chemotherapy with Herceptin, Taxol, and Zometa under the care of Dr. Kishore Valladares.  I will place a consult for hematology/oncology to assess the patient during this hospitalization.  Her last chemotherapy was on Thursday 03/15/2018.   6.  Dehydration:  The patient is volume depleted and we will treat with normal saline as outlined above.   7.  Anemia:  Hemoglobin is 9.9.  I suspect this is acute on chronic due to recent chemotherapy.  The most recent hemoglobin level available in Epic is from 2017, which was 10.8.  At this juncture, there is no clinical evidence for gastrointestinal bleed.  Her hemoglobin will be monitored.   8.  Physical deconditioning:  This is due to above.  I will order PT/OT evaluation and therapy.   9.  DVT prophylaxis:  Lovenox at 40 mg subQ daily.  It will be increased to therapeutic dose if CT or Doppler shows thromboembolism.   10.  Code status:  This was discussed and a full code established.   11.  Disposition:  I anticipate more than 48 hours of hospital stay.      I would like to thank Kishore Valladares MD, for allowing the hospitalist service to participate in the care of this patient.     Addendum-Hospitalist:  CT chest shows patchy RLL infiltrates and fairly extensive RML consolidation. CT is negative for PE and Doppler shows no DVT. Will obtain sputum culture and start Cefriaxone/azithromycin per CAP protocol.        MARCIA CANCINO MD             D: 2018   T: 2018   MT: LISA      Name:     HARJEET IVEY   MRN:      [id number]        Account:      [id number]   :      1935        Admitted:     2018                   Document: [id number]       cc: Kishore Valladares MD

## 2018-03-17 NOTE — PLAN OF CARE
Problem: Patient Care Overview  Goal: Plan of Care/Patient Progress Review  Outcome: No Change  Admitted at 6 pm to room 522. Afebrile, no pain. Frequent productive cough. On 2 L NC. Diminished lung sounds. Resting comfortably.

## 2018-03-17 NOTE — ED NOTES
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  )      ED Diagnosis:   Final diagnoses:   Dysuria   Hypoxia       Code Status: Full Code    Allergies:   Allergies   Allergen Reactions     Iodine Hives     Had iodine allergy 30 yrs ago gets premeds prior to tests. Unsure what the allergy was, Unknown any further details       Activity level - Baseline/Home:  Independent    Activity Level - Current:   Independent     Needed?: No    Isolation: No  Infection: Not Applicable    Bariatric?: No    Vital Signs:   Vitals:    03/17/18 1158 03/17/18 1345 03/17/18 1455 03/17/18 1600   BP: 137/67 157/80  136/66   Pulse: 89      Resp: 20      Temp: 98.3  F (36.8  C)      TempSrc: Oral      SpO2: 95% 92% (!) 88% 92%   Weight: 68.5 kg (151 lb)      Height: 1.803 m (5' 11\")          Cardiac Rhythm: ,        Pain level:      Is this patient confused?: No     Patient Report: Initial Complaint: Lakisha Burroughs is a 82 year old female, currently being treated for breast cancer with chemotherapy, 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 She reports this is similar to how she feels when she takes Benadryl. During this, the people who were with her stated she stopped talking for short time Her last chemotherapy administration was two days ago where she received Taxil. Patient notes a low grade fever of 99.5 two days ago. She notes nausea this morning and new left leg swelling after twisting her ankle about two weeks ago. She states she has bilateral foot pain accompanying this. One week ago, her family states that she had an episodes of difficulty breathing and wheezing. She endorses a chronic cough that has been present for 7 months. She notes the chemotherapy induces diarrhea and constipation. She states she has progressively worsening vision. She had an echocardiogram performed last week. Patient " denies vomiting, dysuria, rash, headache, double vision, chest pain and shortness of breath  Focused Assessment: A/O x3, lungs clear, reports generalized weakness, chest xray, CT head  Tests Performed: labs, chest xray, CT head, CT chest, US legs   Abnormal Results:   Results for orders placed or performed during the hospital encounter of 03/17/18 (from the past 24 hour(s))   Influenza A/B antigen   Result Value Ref Range    Influenza A/B Agn Specimen Nasal     Influenza A Negative NEG^Negative    Influenza B Negative NEG^Negative   CBC with platelets differential   Result Value Ref Range    WBC 6.3 4.0 - 11.0 10e9/L    RBC Count 3.04 (L) 3.8 - 5.2 10e12/L    Hemoglobin 9.9 (L) 11.7 - 15.7 g/dL    Hematocrit 29.6 (L) 35.0 - 47.0 %    MCV 97 78 - 100 fl    MCH 32.6 26.5 - 33.0 pg    MCHC 33.4 31.5 - 36.5 g/dL    RDW 15.1 (H) 10.0 - 15.0 %    Platelet Count 236 150 - 450 10e9/L    Diff Method Automated Method     % Neutrophils 88.9 %    % Lymphocytes 5.9 %    % Monocytes 4.6 %    % Eosinophils 0.2 %    % Basophils 0.2 %    % Immature Granulocytes 0.2 %    Nucleated RBCs 0 0 /100    Absolute Neutrophil 5.6 1.6 - 8.3 10e9/L    Absolute Lymphocytes 0.4 (L) 0.8 - 5.3 10e9/L    Absolute Monocytes 0.3 0.0 - 1.3 10e9/L    Absolute Eosinophils 0.0 0.0 - 0.7 10e9/L    Absolute Basophils 0.0 0.0 - 0.2 10e9/L    Abs Immature Granulocytes 0.0 0 - 0.4 10e9/L    Absolute Nucleated RBC 0.0    Comprehensive metabolic panel   Result Value Ref Range    Sodium 133 133 - 144 mmol/L    Potassium 4.3 3.4 - 5.3 mmol/L    Chloride 100 94 - 109 mmol/L    Carbon Dioxide 25 20 - 32 mmol/L    Anion Gap 8 3 - 14 mmol/L    Glucose 146 (H) 70 - 99 mg/dL    Urea Nitrogen 15 7 - 30 mg/dL    Creatinine 0.78 0.52 - 1.04 mg/dL    GFR Estimate 71 >60 mL/min/1.7m2    GFR Estimate If Black 86 >60 mL/min/1.7m2    Calcium 8.6 8.5 - 10.1 mg/dL    Bilirubin Total 0.8 0.2 - 1.3 mg/dL    Albumin 3.4 3.4 - 5.0 g/dL    Protein Total 7.4 6.8 - 8.8 g/dL    Alkaline  Phosphatase 70 40 - 150 U/L    ALT 23 0 - 50 U/L    AST 37 0 - 45 U/L   Lipase   Result Value Ref Range    Lipase 197 73 - 393 U/L   Troponin I   Result Value Ref Range    Troponin I ES <0.015 0.000 - 0.045 ug/L   TSH with free T4 reflex   Result Value Ref Range    TSH 0.85 0.40 - 4.00 mU/L   XR Chest 2 Views    Narrative    CHEST TWO VIEWS  3/17/2018 1:18 PM     COMPARISON: Two view chest x-ray 9/26/2017.    HISTORY: Cough    FINDINGS: The cardiac silhouette, pulmonary vasculature, lungs and  pleural spaces are within normal limits.      Impression    IMPRESSION: Clear lungs.    KEE RIOS MD   UA with Microscopic   Result Value Ref Range    Color Urine Yellow     Appearance Urine Clear     Glucose Urine Negative NEG^Negative mg/dL    Bilirubin Urine Negative NEG^Negative    Ketones Urine Negative NEG^Negative mg/dL    Specific Gravity Urine 1.016 1.003 - 1.035    Blood Urine Negative NEG^Negative    pH Urine 6.5 5.0 - 7.0 pH    Protein Albumin Urine 10 (A) NEG^Negative mg/dL    Urobilinogen mg/dL Normal 0.0 - 2.0 mg/dL    Nitrite Urine Negative NEG^Negative    Leukocyte Esterase Urine Moderate (A) NEG^Negative    Source Midstream Urine     WBC Urine 27 (H) 0 - 5 /HPF    RBC Urine 2 0 - 2 /HPF    Bacteria Urine Few (A) NEG^Negative /HPF    Squamous Epithelial /HPF Urine 1 0 - 1 /HPF    Mucous Urine Present (A) NEG^Negative /LPF    Hyaline Casts 4 (H) 0 - 2 /LPF   CT Head w/o Contrast    Narrative    CT OF THE HEAD WITHOUT CONTRAST 3/17/2018 2:20 PM     COMPARISON: None    HISTORY: Brief aphasia, evaluate bleed or mass.    TECHNIQUE: 5 mm thick axial CT images of the head were acquired  without IV contrast material.    FINDINGS: There is mild diffuse cerebral volume loss. There are subtle  patchy areas of decreased density in the cerebral white matter  bilaterally that are consistent with sequela of chronic small vessel  ischemic disease.    The ventricles and basal cisterns are within normal limits  in  configuration given the degree of cerebral volume loss. There is no  midline shift. There are no extra-axial fluid collections.    No intracranial hemorrhage, mass or recent infarct.    The visualized paranasal sinuses are well-aerated. There is no  mastoiditis. There are no fractures of the visualized bones.      Impression    IMPRESSION: Diffuse cerebral volume loss and cerebral white matter  changes consistent with chronic small vessel ischemic disease. No  evidence for acute intracranial pathology.        Radiation dose for this scan was reduced using automated exposure  control, adjustment of the mA and/or kV according to patient size, or  iterative reconstruction technique    KEE RIOS MD       Treatments provided: NS bolus, rocephin IV,     Family Comments: daughter present    OBS brochure/video discussed/provided to patient: No    ED Medications:   Medications   0.9% sodium chloride BOLUS (1,000 mLs Intravenous New Bag 3/17/18 1241)     Followed by   sodium chloride 0.9% infusion (not administered)   iopamidol (ISOVUE-370) solution 59 mL (not administered)   SalineFlush (not administered)   methylPREDNISolone sodium succinate (solu-MEDROL) injection 125 mg (not administered)   diphenhydrAMINE (BENADRYL) injection 25 mg (not administered)   cefTRIAXone (ROCEPHIN) 1 g in 10 mL SWFI Premix Syringe (1 g Intravenous Given 3/17/18 7035)       Drips infusing?:  No    For the majority of the shift this patient was Green.   Interventions performed were na.    Severe Sepsis OR Septic Shock Diagnosis Present: No      ED NURSE PHONE NUMBER: *[phone]

## 2018-03-17 NOTE — IP AVS SNAPSHOT
MRN:[id number]                      After Visit Summary   3/17/2018    Lakisha Burroughs    MRN: [id number]           Thank you!     Thank you for choosing [facility] for your care. Our goal is always to provide you with excellent care. Hearing back from our patients is one way we can continue to improve our services. Please take a few minutes to complete the written survey that you may receive in the mail after you visit with us. Thank you!        Patient Information     Date Of Birth          1935        Designated Caregiver       Most Recent Value    Caregiver    Will someone help with your care after discharge? no      About your hospital stay     You were admitted on:  March 17, 2018 You last received care in the:  [place] [facility] [address] [facility]    You were discharged on:  March 21, 2018        Reason for your hospital stay       You were admitted for pneumonia in your right middle and lower lung.  You were treated with antibiotics and you are improving.  You should complete an additional 6 days of levaquin (antibiotic).  You can take robitussin DM cough medicine as needed but do not need to take it if you are comfortable without it. We stopped your naproxen because it can worsen kidney function and acetaminophen is a safer over the counter pain medicine for you to use for now. Follow up with your oncologist for ongoing cancer treatment. It was a pleasure for our Hospitalist group and me personally to care for you at [facility] [place].  We wish you a speedy recovery and good health!                  Who to Call     For medical emergencies, please call 911.  For non-urgent questions about your medical care, please call your primary care provider or clinic, [phone]          Attending Provider     Provider Specialty    Víctor Kirk MD Emergency Medicine    Andrea Bauman MD Internal Medicine       Primary Care Provider Office Phone # Fax #    Kishore Valladares  [phone]      After Care Instructions     Activity       Your activity upon discharge: activity as tolerated            Diet       Follow this diet upon discharge: Regular diet                  Follow-up Appointments     Follow-up and recommended labs and tests        Follow up with primary care provider, Kishore Valladares, or your oncologist within 7 days for hospital follow- up.  The following labs/tests are recommended: CBC and BMP.  Follow up with oncology as planned                  Further instructions from your care team       Please seek medical attention for a fever greater than 101.0, dizziness, shortness of breath, chest pain, nausea or vomiting      Pending Results     Date and Time Order Name Status Description    3/17/2018 1735 Blood culture Preliminary     3/17/2018 1735 Blood culture Preliminary             Statement of Approval     Ordered          03/21/18 1143  I have reviewed and agree with all the recommendations and orders detailed in this document.  EFFECTIVE NOW     Approved and electronically signed by:  Curly Yang MD             Admission Information     Date & Time Provider Department Dept. Phone    3/17/2018 Andrea Bauman MD [place] [facility] [address] [facility] [phone]      Your Vitals Were     Blood Pressure Pulse Temperature Respirations Height Weight    141/83 (BP Location: Right arm) 81 98.2  F (36.8  C) (Oral) 16 1.549 m (5' 1\") 68.1 kg (150 lb 3.2 oz)    Pulse Oximetry BMI (Body Mass Index)                96% 28.38 kg/m2          [facility] Information     [facility] lets you send messages to your doctor, view your test results, renew your prescriptions, schedule appointments and more. To sign up, go to [email or web address] . Click on \"Log in\" on the left side of the screen, which will take you to the Welcome page. Then click on \"Sign up Now\" on the right side of the page.     You will be asked to enter the access code listed below, as well as " some personal information. Please follow the directions to create your username and password.     Your access code is: [email or web address]  Expires: 2018  8:03 AM     Your access code will  in 90 days. If you need help or a new code, please call your [facility] clinic or [phone].        Care EveryWhere ID     This is your Care EveryWhere ID. This could be used by other organizations to access your [facility] medical records  [id number]        Equal Access to Services     LEI ECHEVERRIA : Hadii luke dawson hadasho Soemmettali, waaxda luqadaha, qaybta kaalmada adeegyaerik, katelyn flores . So [facility] [phone].    ATENCIÓN: Si habla español, tiene a guerrero disposición servicios gratuitos de asistencia lingüística. Llame al [phone].    We comply with applicable federal civil rights laws and [place] laws. We do not discriminate on the basis of race, color, national origin, age, disability, sex, sexual orientation, or gender identity.               Review of your medicines      START taking        Dose / Directions    levofloxacin 250 MG tablet   Commonly known as:  LEVAQUIN   Indication:  Community Acquired Pneumonia   Used for:  Community acquired pneumonia of right middle lobe of lung (H)        Dose:  500 mg   Take 2 tablets (500 mg) by mouth daily for 6 days   Quantity:  12 tablet   Refills:  0         CONTINUE these medicines which have NOT CHANGED        Dose / Directions    acetaminophen 500 MG tablet   Commonly known as:  TYLENOL   Used for:  Viral URI        Dose:  1000 mg   Take 2 tablets (1,000 mg) by mouth 3 times daily   Refills:  0       BENADRYL PO        Dose:  25 mg   Take 25 mg by mouth nightly as needed   Refills:  0       calcium carbonate-vitamin D 600-400 MG-UNIT Chew        Dose:  1 tablet   Take 1 tablet by mouth daily   Refills:  0       CENTRUM SILVER per tablet        Dose:  1 tablet   Take 1 tablet by mouth daily   Refills:  0       VITAMIN B 12 PO        Dose:  1  tablet   Take 1 tablet by mouth daily Chewable tablet   Refills:  0       VITAMIN D (CHOLECALCIFEROL) PO        Dose:  1000 Units   Take 1,000 Units by mouth daily   Refills:  0       ZOLPIDEM TARTRATE PO        Dose:  5 mg   Take 5 mg by mouth nightly as needed for sleep   Refills:  0         STOP taking     ALEVE PO                Where to get your medicines      These medications were sent to [facility] [place], [place] - [address]  [address], [place] [address]     Phone:  [phone]     levofloxacin 250 MG tablet                Protect others around you: Learn how to safely use, store and throw away your medicines at www.disposemymeds.org.        ANTIBIOTIC INSTRUCTION     You've Been Prescribed an Antibiotic - Now What?  Your healthcare team thinks that you or your loved one might have an infection. Some infections can be treated with antibiotics, which are powerful, life-saving drugs. Like all medications, antibiotics have side effects and should only be used when necessary. There are some important things you should know about your antibiotic treatment.      Your healthcare team may run tests before you start taking an antibiotic.    Your team may take samples (e.g., from your blood, urine or other areas) to run tests to look for bacteria. These test can be important to determine if you need an antibiotic at all and, if you do, which antibiotic will work best.      Within a few days, your healthcare team might change or even stop your antibiotic.    Your team may start you on an antibiotic while they are working to find out what is making you sick.    Your team might change your antibiotic because test results show that a different antibiotic would be better to treat your infection.    In some cases, once your team has more information, they learn that you do not need an antibiotic at all. They may find out that you don't have an infection, or that the antibiotic you're  taking won't work against your infection. For example, an infection caused by a virus can't be treated with antibiotics. Staying on an antibiotic when you don't need it is more likely to be harmful than helpful.      You may experience side effects from your antibiotic.    Like all medications, antibiotics have side effects. Some of these can be serious.    Let you healthcare team know if you have any known allergies when you are admitted to the hospital.    One significant side effect of nearly all antibiotics is the risk of severe and sometimes deadly diarrhea caused by Clostridium difficile (C. Difficile). This occurs when a person takes antibiotics because some good germs are destroyed. Antibiotic use allows C. diificile to take over, putting patients at high risk for this serious infection.    As a patient or caregiver, it is important to understand your or your loved one's antibiotic treatment. It is especially important for caregivers to speak up when patients can't speak for themselves. Here are some important questions to ask your healthcare team.    What infection is this antibiotic treating and how do you know I have that infection?    What side effects might occur from this antibiotic?    How long will I need to take this antibiotic?    Is it safe to take this antibiotic with other medications or supplements (e.g., vitamins) that I am taking?     Are there any special directions I need to know about taking this antibiotic? For example, should I take it with food?    How will I be monitored to know whether my infection is responding to the antibiotic?    What tests may help to make sure the right antibiotic is prescribed for me?      Information provided by:  www.cdc.gov/getsmart  U.S. Department of Health and Human Services  Centers for disease Control and Prevention  National Center for Emerging and Zoonotic Infectious Diseases  Division of Healthcare Quality Promotion             Medication List: This  is a list of all your medications and when to take them. Check marks below indicate your daily home schedule. Keep this list as a reference.      Medications           Morning Afternoon Evening Bedtime As Needed    acetaminophen 500 MG tablet   Commonly known as:  TYLENOL   Take 2 tablets (1,000 mg) by mouth 3 times daily   Last time this was given:  1,000 mg on 3/20/2018  8:53 AM                                BENADRYL PO   Take 25 mg by mouth nightly as needed   Last time this was given:  25 mg on 3/21/2018  1:49 AM                                calcium carbonate-vitamin D 600-400 MG-UNIT Chew   Take 1 tablet by mouth daily                                CENTRUM SILVER per tablet   Take 1 tablet by mouth daily                                levofloxacin 250 MG tablet   Commonly known as:  LEVAQUIN   Take 2 tablets (500 mg) by mouth daily for 6 days   Last time this was given:  500 mg on 3/21/2018  8:07 AM   Next Dose Due:  3/22/18 at 8am                                   VITAMIN B 12 PO   Take 1 tablet by mouth daily Chewable tablet                                VITAMIN D (CHOLECALCIFEROL) PO   Take 1,000 Units by mouth daily   Last time this was given:  1,000 Units on 3/21/2018  8:07 AM                                ZOLPIDEM TARTRATE PO   Take 5 mg by mouth nightly as needed for sleep   Last time this was given:  5 mg on 3/20/2018 10:05 PM

## 2018-03-17 NOTE — ED PROVIDER NOTES
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 She reports this is similar to how she feels when she takes Benadryl. During this, the people who were with her stated she stopped talking for short time Her last chemotherapy administration was two days ago where she received Taxil. Patient notes a low grade fever of 99.5 two days ago. She notes nausea this morning and new left leg swelling after twisting her ankle about two weeks ago. She states she has bilateral foot pain accompanying this. One week ago, her family states that she had an episodes of difficulty breathing and wheezing. She endorses a chronic cough that has been present for 7 months. She notes the chemotherapy induces diarrhea and constipation. She states she has progressively worsening vision over multiple months. She had an echocardiogram performed last week. Patient denies vomiting, dysuria, rash, headache, double vision, chest pain and shortness of breath. She does not lay flat.     Cardiac Risk Factors   Sex: Female   Tobacco: Negative   Hypertension: Negative  Diabetes: Negative  Hyperlipidemia: Negative  Family History: Negative    PE/DVT Risk Factors   Personal History: Negative  Recent Travel: Negative   Recent Surgery/Hospitalization: Negative  Tobacco: Negative  Family History: Negative  Hormone Use: Negative   Cancer: Positive  Trauma: Negative      Allergies:  Iodine     Medications:    Tylenol   Mucinex  Sudafed  Norco  Zolpidem Tartrate  Vitamin B  Centrum silver  Vitamin D  Aleve  Benadryl     Past Medical History:    Breast cancer  URI    Past Surgical History:    Breast surgery  Orthopedic surgery    Family History:    History reviewed. No pertinent family history.    Social History:  Marital Status:   Presents to the ED via EMS with  "her daughter and friend following.  Tobacco Use: Never  Alcohol Use: No  PCP: Kishore Valladares     Review of Systems   Eyes: Negative for visual disturbance.   Respiratory: Positive for cough. Negative for shortness of breath.    Cardiovascular: Positive for leg swelling. Negative for chest pain.   Gastrointestinal: Positive for constipation, diarrhea and nausea. Negative for vomiting.   Genitourinary: Negative for dysuria.   Skin: Negative for rash.   Neurological: Positive for weakness. Negative for headaches.   All other systems reviewed and are negative.      Physical Exam   First Vitals:  BP: 137/67  Pulse: 89  Temp: 98.3  F (36.8  C)  Resp: 20  Height: 180.3 cm (5' 11\")  Weight: 68.5 kg (151 lb)  SpO2: 95 %      Physical Exam  Constitutional: Well developed, nontox appearance  Head: Atraumatic.   Mouth/Throat: Oropharynx is clear and moist.   Neck:  no stridor  Eyes: no scleral icterus  Cardiovascular: RRR, 2+ bilat radial pulses  Pulmonary/Chest: Mild coarse breath sounds bilaterally    Abdominal: ND, +BS, soft, NT, no rebound or guarding   : no CVA tenderness bilat  Ext: Warm, well perfused, trace lower extremity edema  Neurological: A&O. CNII-XII intact, nml finger to nose, 5/5 strength throughout upper and lower ext, symmetric; sensation grossly intact  Skin: Skin is warm and dry.   Psychiatric: Behavior is normal. Thought content normal.   Nursing note and vitals reviewed.    Emergency Department Course     Imaging:  Radiographic findings were communicated with the patient who voiced understanding of the findings.    CT Chest Pulmonary Embolism w Contrast   Preliminary Result   IMPRESSION:    1. No pulmonary embolism demonstrated.   2. No thoracic aortic dissection or aneurysm.    3. Patchy right lower lobe infiltrates and fairly extensive right   middle lobe consolidation.   4. Elevated left hemidiaphragm.        US Lower Extremity Venous Duplex Bilateral   Preliminary Result   IMPRESSION: No DVT " demonstrated.       CT Head w/o Contrast   Final Result   IMPRESSION: Diffuse cerebral volume loss and cerebral white matter   changes consistent with chronic small vessel ischemic disease. No   evidence for acute intracranial pathology.            Radiation dose for this scan was reduced using automated exposure   control, adjustment of the mA and/or kV according to patient size, or   iterative reconstruction technique      KEE RIOS MD      XR Chest 2 Views   Final Result   IMPRESSION: Clear lungs.      KEE RIOS MD        Laboratory:  CBC:  WBC 6.3, HGB 9.9 (L),   CMP: Glucose 146 (H), otherwise WNL (Creatinine 0.78)  Lipase: 197  Troponin: <0.015  TSH with free T4 reflex: 0.85  Influenza A/B antigen: Negative  UA: Clear yellow urine, protein 10, moderate leukocyte esterase, WBC 27 (H), few bacteria, mucous present, hyaline casts 4 (H), otherwise WNL  Urine culture: Pending    Interventions:  1241: Normal Saline, 1 liter, IV bolus   1545: Rocephin, 1 g, IV injection  1657: Solu-medrol, 125 mg, IV injection  1657: Benadryl, 25 mg, IV injection    Emergency Department Course:  Nursing notes and vitals reviewed.  1204: I performed an exam of the patient as documented above.  The above workup was undertaken.  Findings and plan explained to the patient who consents to admission.   1533: I discussed the patient with Dr. Bauman of the hospitalist service, who will admit the patient to a med bed for further monitoring, evaluation, and treatment.    Impression & Plan      Medical Decision Makin-year-old female presenting with episode of generalized weakness, possible brief aphasia     Differential diagnosis includes presyncope, syncope, electrolyte abnormality, infection such as pneumonia or UTI, adverse reaction to chemotherapy.  Patient's neurologic exam is within normal limits on my evaluation.  It is hard to know whether the patient had lindsay aphasia indicating TIA or if this was an episode of  presyncope with transient cerebral hypoperfusion.  Labs ordered as noted above with UA concerning for possible infection although the patient does not have any urinary symptoms.  During her ED course she became hypoxic subsequently needing supplementary oxygen.  Chest x-ray negative for acute cardiopulmonary disease.  Given her episode of hypoxia, a CT chest PE protocol as well as bilateral lower extremity duplex ultrasounds to evaluate DVT were ordered.  Given her allergy to iodine the patient was pretreated with Benadryl and Solu-Medrol.  She subsequently admitted to the hospital service for further evaluation and treatment with CT and ultrasounds pending.  Patient was counseled on results, diagnosis and disposition prior to admission.  She is understanding and agreeable to plan.  Patient was subsequently admitted in stable condition.  Prior to admission the patient was given ceftriaxone for treatment of UTI and urine culture was added on.    Diagnosis:    ICD-10-CM    1. Dysuria R30.0 Urine Culture   2. Hypoxia R09.02        Disposition:  Admitted to an adult med bed.       Cherrie PEREZ, am serving as a scribe on 3/17/2018 at 12:04 PM to personally document services performed by Dr. Kirk based on my observations and the provider's statements to me.    EMERGENCY DEPARTMENT       Víctor Kirk MD  03/18/18 0054

## 2018-03-17 NOTE — PHARMACY-ADMISSION MEDICATION HISTORY
Admission medication history interview status for the 3/17/2018  admission is complete. See EPIC admission navigator for prior to admission medications     Medication history source reliability:Good    Actions taken by pharmacist (provider contacted, etc):None     Additional medication history information not noted on PTA med list :None    Medication reconciliation/reorder completed by provider prior to medication history? Yes    Time spent in this activity: 15 minutes    Prior to Admission medications    Medication Sig Last Dose Taking? Auth Provider   calcium carbonate-vitamin D 600-400 MG-UNIT CHEW Take 1 tablet by mouth daily 3/17/2018 at am Yes Unknown, Entered By History   acetaminophen (TYLENOL) 500 MG tablet Take 2 tablets (1,000 mg) by mouth 3 times daily prn Yes Veronica Cunningham PA-C   ZOLPIDEM TARTRATE PO Take 5 mg by mouth nightly as needed for sleep Past Month at Unknown time Yes Unknown, Entered By History   Cyanocobalamin (VITAMIN B 12 PO) Take 1 tablet by mouth daily Chewable tablet 3/17/2018 at am Yes Unknown, Entered By History   Multiple Vitamins-Minerals (CENTRUM SILVER) per tablet Take 1 tablet by mouth daily 3/17/2018 at am Yes Unknown, Entered By History   VITAMIN D, CHOLECALCIFEROL, PO Take 1,000 Units by mouth daily 3/17/2018 at am Yes Unknown, Entered By History   Naproxen Sodium (ALEVE PO) Take 220 mg by mouth daily as needed for moderate pain prn Yes Unknown, Entered By History   DiphenhydrAMINE HCl (BENADRYL PO) Take 25 mg by mouth nightly as needed prn Yes Unknown, Entered By History

## 2018-03-17 NOTE — IP AVS SNAPSHOT
[address] [facility]    [address] [place] [address]    Phone:  [phone]                                       After Visit Summary   3/17/2018    Lakisha Burroughs    MRN: [id number]           After Visit Summary Signature Page     I have received my discharge instructions, and my questions have been answered. I have discussed any challenges I see with this plan with the nurse or doctor.    ..........................................................................................................................................  Patient/Patient Representative Signature      ..........................................................................................................................................  Patient Representative Print Name and Relationship to Patient    ..................................................               ................................................  Date                                            Time    ..........................................................................................................................................  Reviewed by Signature/Title    ...................................................              ..............................................  Date                                                            Time

## 2018-03-17 NOTE — PROGRESS NOTES
RECEIVING UNIT ED HANDOFF REVIEW    ED Nurse Handoff Report was reviewed by: Lynette Shore on March 17, 2018 at 4:17 PM          Regarding: Question about RX Atenolol  ----- Message from Bolivar Alvarado sent at 8/19/2017  9:34 AM CDT -----  Patient Name: Seth Lancaster  Specialist or PCP: Bolivar Spencer  Pregnant (If Yes, how long?):no  Symptoms:Question about RX Atenolol  Call Back #:[phone]  Is the patient’s permanent residence located in [place], [place], or a [facility]? Yes [facility] [address]  Call Center Account #:[id number]

## 2018-03-18 LAB
ALBUMIN UR-MCNC: 10 MG/DL
ANION GAP SERPL CALCULATED.3IONS-SCNC: 8 MMOL/L (ref 3–14)
APPEARANCE UR: CLEAR
BILIRUB UR QL STRIP: NEGATIVE
BUN SERPL-MCNC: 15 MG/DL (ref 7–30)
CALCIUM SERPL-MCNC: 8.1 MG/DL (ref 8.5–10.1)
CHLORIDE SERPL-SCNC: 106 MMOL/L (ref 94–109)
CO2 SERPL-SCNC: 22 MMOL/L (ref 20–32)
COLOR UR AUTO: YELLOW
CREAT SERPL-MCNC: 0.71 MG/DL (ref 0.52–1.04)
ERYTHROCYTE [DISTWIDTH] IN BLOOD BY AUTOMATED COUNT: 14.7 % (ref 10–15)
GFR SERPL CREATININE-BSD FRML MDRD: 79 ML/MIN/1.7M2
GLUCOSE SERPL-MCNC: 193 MG/DL (ref 70–99)
GLUCOSE UR STRIP-MCNC: 150 MG/DL
GRAM STN SPEC: NORMAL
HBA1C MFR BLD: 6.4 % (ref 4.3–6)
HCT VFR BLD AUTO: 27.1 % (ref 35–47)
HGB BLD-MCNC: 9.4 G/DL (ref 11.7–15.7)
HGB UR QL STRIP: NEGATIVE
KETONES UR STRIP-MCNC: NEGATIVE MG/DL
LEUKOCYTE ESTERASE UR QL STRIP: NEGATIVE
Lab: NORMAL
MAGNESIUM SERPL-MCNC: 2.1 MG/DL (ref 1.6–2.3)
MCH RBC QN AUTO: 33.2 PG (ref 26.5–33)
MCHC RBC AUTO-ENTMCNC: 34.7 G/DL (ref 31.5–36.5)
MCV RBC AUTO: 96 FL (ref 78–100)
NITRATE UR QL: NEGATIVE
PH UR STRIP: 6 PH (ref 5–7)
PLATELET # BLD AUTO: 201 10E9/L (ref 150–450)
POTASSIUM SERPL-SCNC: 3.9 MMOL/L (ref 3.4–5.3)
PROCALCITONIN SERPL-MCNC: 0.79 NG/ML
RBC # BLD AUTO: 2.83 10E12/L (ref 3.8–5.2)
RBC #/AREA URNS AUTO: 1 /HPF (ref 0–2)
SODIUM SERPL-SCNC: 136 MMOL/L (ref 133–144)
SOURCE: ABNORMAL
SP GR UR STRIP: 1.02 (ref 1–1.03)
SPECIMEN SOURCE: NORMAL
UROBILINOGEN UR STRIP-MCNC: NORMAL MG/DL (ref 0–2)
WBC # BLD AUTO: 6.3 10E9/L (ref 4–11)
WBC #/AREA URNS AUTO: 3 /HPF (ref 0–5)

## 2018-03-18 PROCEDURE — 25000128 H RX IP 250 OP 636: Performed by: INTERNAL MEDICINE

## 2018-03-18 PROCEDURE — 85027 COMPLETE CBC AUTOMATED: CPT | Performed by: INTERNAL MEDICINE

## 2018-03-18 PROCEDURE — 36415 COLL VENOUS BLD VENIPUNCTURE: CPT | Performed by: INTERNAL MEDICINE

## 2018-03-18 PROCEDURE — 81001 URINALYSIS AUTO W/SCOPE: CPT | Performed by: INTERNAL MEDICINE

## 2018-03-18 PROCEDURE — 25000132 ZZH RX MED GY IP 250 OP 250 PS 637: Mod: GY | Performed by: INTERNAL MEDICINE

## 2018-03-18 PROCEDURE — A9270 NON-COVERED ITEM OR SERVICE: HCPCS | Mod: GY | Performed by: INTERNAL MEDICINE

## 2018-03-18 PROCEDURE — 87205 SMEAR GRAM STAIN: CPT | Performed by: INTERNAL MEDICINE

## 2018-03-18 PROCEDURE — 99232 SBSQ HOSP IP/OBS MODERATE 35: CPT | Performed by: INTERNAL MEDICINE

## 2018-03-18 PROCEDURE — 83036 HEMOGLOBIN GLYCOSYLATED A1C: CPT | Performed by: INTERNAL MEDICINE

## 2018-03-18 PROCEDURE — 12000007 ZZH R&B INTERMEDIATE

## 2018-03-18 PROCEDURE — 99207 ZZC CDG-MDM COMPONENT: MEETS LOW - DOWN CODED: CPT | Performed by: INTERNAL MEDICINE

## 2018-03-18 PROCEDURE — 84145 PROCALCITONIN (PCT): CPT | Performed by: INTERNAL MEDICINE

## 2018-03-18 PROCEDURE — 83735 ASSAY OF MAGNESIUM: CPT | Performed by: INTERNAL MEDICINE

## 2018-03-18 PROCEDURE — 87070 CULTURE OTHR SPECIMN AEROBIC: CPT | Performed by: INTERNAL MEDICINE

## 2018-03-18 PROCEDURE — 27210995 ZZH RX 272: Performed by: INTERNAL MEDICINE

## 2018-03-18 PROCEDURE — 80048 BASIC METABOLIC PNL TOTAL CA: CPT | Performed by: INTERNAL MEDICINE

## 2018-03-18 RX ORDER — GUAIFENESIN/DEXTROMETHORPHAN 100-10MG/5
10 SYRUP ORAL EVERY 4 HOURS PRN
Status: DISCONTINUED | OUTPATIENT
Start: 2018-03-18 | End: 2018-03-21 | Stop reason: HOSPADM

## 2018-03-18 RX ORDER — SACCHAROMYCES BOULARDII 250 MG
250 CAPSULE ORAL 2 TIMES DAILY
Status: DISCONTINUED | OUTPATIENT
Start: 2018-03-18 | End: 2018-03-21 | Stop reason: HOSPADM

## 2018-03-18 RX ADMIN — ACETAMINOPHEN 500 MG: 500 TABLET, FILM COATED ORAL at 16:45

## 2018-03-18 RX ADMIN — Medication 250 MG: at 20:11

## 2018-03-18 RX ADMIN — AZITHROMYCIN MONOHYDRATE 250 MG: 500 INJECTION, POWDER, LYOPHILIZED, FOR SOLUTION INTRAVENOUS at 16:45

## 2018-03-18 RX ADMIN — ENOXAPARIN SODIUM 40 MG: 40 INJECTION SUBCUTANEOUS at 18:22

## 2018-03-18 RX ADMIN — ZOLPIDEM TARTRATE 5 MG: 5 TABLET, FILM COATED ORAL at 21:38

## 2018-03-18 RX ADMIN — ACETAMINOPHEN 1000 MG: 500 TABLET, FILM COATED ORAL at 21:38

## 2018-03-18 RX ADMIN — SODIUM CHLORIDE: 9 INJECTION, SOLUTION INTRAVENOUS at 08:17

## 2018-03-18 RX ADMIN — Medication 250 MG: at 16:45

## 2018-03-18 RX ADMIN — SODIUM CHLORIDE: 9 INJECTION, SOLUTION INTRAVENOUS at 20:10

## 2018-03-18 RX ADMIN — GUAIFENESIN AND DEXTROMETHORPHAN 10 ML: 100; 10 SYRUP ORAL at 11:10

## 2018-03-18 RX ADMIN — ACETAMINOPHEN 500 MG: 500 TABLET, FILM COATED ORAL at 08:12

## 2018-03-18 RX ADMIN — CEFTRIAXONE SODIUM 2 G: 10 INJECTION, POWDER, FOR SOLUTION INTRAVENOUS at 18:22

## 2018-03-18 RX ADMIN — CHOLECALCIFEROL TAB 25 MCG (1000 UNIT) 1000 UNITS: 25 TAB at 08:12

## 2018-03-18 NOTE — PLAN OF CARE
Problem: Patient Care Overview  Goal: Plan of Care/Patient Progress Review  Outcome: Improving  A/Ox4. Forgetful. Coarse and crackles in lower lobes. Frequent and productive cough. IS encouraged. SBA for mobility. 1+ edema on bilateral ankle and RUE. IVF infusing. Voiding small amount. UA negative. Afebrile. VSS on RA. Thoracic pain with coughing. Prn robitussin given x1, not very effective per pt. Discharge pending.     [id number]  Coarse LS. O2 sat 98% on RA. Stable gait. Pain from coughing continues, managed with scheduled tylenol. Refuses robitussin. Continue zithromax and rocephin. Discharge pending.

## 2018-03-18 NOTE — CONSULTS
Consult Date:  03/18/2018      REQUESTING PHYSICIAN:  Dr. Bauman.      REASON FOR CONSULTATION:  Recent metastatic breast cancer, receiving chemotherapy, now admitted for concern for pneumonia and weakness.      HISTORY OF PRESENT ILLNESS:  This is an 82-year-old female who follows with Dr. Kishore Valladares.  She has a remote history of colon cancer, but more recently she has a history of metastatic breast cancer.        ONCOLOGICAL HISTORY:     1.  In 2009 was diagnosed with a left-sided stage IIB breast cancer, and she had a high-grade cancer which was positive for ER and positive for HER-2; however, she refused adjuvant chemotherapy and trastuzumab and received anastrozole for 5 years completing treatment in 2014.   2.  In 07/2017, complained of chest pain which escalated.  An August CT scan showed enlarged left internal mammary lymph nodes, and a PET CT scan confirmed hypermetabolic lymph nodes as well as lymph nodes in the retroperitoneum and a mass involving the sternum with pathological fracture.   3.  Sternal mass confirmed metastatic breast cancer, borderline positive ER, negative for NM and positive for HER-2 overexpression.   4.  Received palliative radiation therapy to the sternal mass from 08/24/2017-09/08/2017 including the sternum internal mammary lymph nodes.   5.  On 12/01/2017, started treatment with paclitaxel and trastuzumab and zoledronic acid for which she has been tolerating not very well in terms of energy and fatigue, but had noted neutropenia.  Her cancer markers have been checked regularly, most recently in February is at 32.9.  The cancer marker has never been very elevated, even at the time of diagnosis of recurrence.   6.  On 02/01/2018:  She had a CT scan of the chest performed at [facility], which is close to her house.  This imaging showed significant reduction in mesenteric and retroperitoneal lymph nodes and left hydronephrosis which was not new.   7.  Her most recent  chemotherapy was provided on 03/15/2018.  Note, she had no evidence of neutropenia at that point.   8.  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  She also had ongoing cough and diarrhea alternating with constipation.  A urinalysis also showed concern for possible urinary tract infection.   9.  She had a CT scan of the chest done in the emergency room, and images personally reviewed showed concern for right lower lobe pneumonia.  She is currently on antibiotics.   10.  Since being in the hospital, breathing is a bit better.  She continues to feel weak.  She feels her legs are swollen, but that is better.  She is hoping to get up and walk and not be staying in the hospital for too long.      PAST MEDICAL HISTORY:   1.  Colon cancer diagnosed in this status post right colon resection and chemotherapy.   2.  Left breast cancer diagnosed in , declined chemotherapy at that point.   3.  Metastatic disease diagnosed in 2017, now found to be HER-2 positive and being treated with paclitaxel, trastuzumab and zoledronic acid.  Details as documented above.   4.  Anemia.      PAST SURGICAL HISTORY:     1.  Right colon resection.   2.  Left mastectomy.   3.  Right knee surgery.   4.  Appendectomy.      FAMILY HISTORY:  Father  of lung cancer at 77.      SOCIAL HISTORY:  She is , lives alone.  She has a supportive family.  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      MEDICATIONS:  At home include Zocor, vitamin D,, occasional antinausea medication and here is receiving enoxaparin, guaifenesin, oxycodone and IV antibiotics.      PHYSICAL EXAMINATION:   GENERAL:  She is in no acute distress.   VITAL SIGNS:  Temperature 97.8, not wearing oxygen, pulse 76, blood pressure 135/75, satting 97%.   HEENT:  Moist mucous membranes, no pallor, icterus or thrush.   NECK:   Supple, no JVP, thyromegaly or lymph nodes.   CARDIOVASCULAR:  Regular rate and rhythm.   RESPIRATORY:  Chest clear to auscultation except bronchi and crackles heard in the right base of the lung.   EXTREMITIES:  No edema on my exam.      LABORATORY DATA:  Shows a white coun, hemoglobin 9.4, platelet count 201.  Chemistries normal.      IMPRESSION/DISCUSSION:   1.  Concern for a right lower lobe pneumonia, not neutropenic, but has been on recent chemotherapy.   2.  Generalized weakness and near-syncope.   3.  Abnormal urinalysis now on ceftriaxone.   4.  Hypoxia has since resolved.  No pulmonary embolism, no DVT in legs.      PLAN:   1.  With regards to metastatic breast cancer, discussed with patient.  Her disease has responded well per recent imaging.  This could be a pneumonia.  She is very concerned that this is progressive disease.  I told her that we would treat her as a pneumonia and see how she does over the next several weeks.   2.  Chemotherapy is currently on hold, and this was planned ahead of time.  She sees Dr. Valladares at the end of the month.   3.  PT, OT.   4.  Incentive spirometer.   5.  Continue antibiotics and guaifenesin for cough.   6.  Code status full.    7.  Appreciate hospitalist's help and will continue current care, and hopefully patient will regain some strength and be discharged in the next few days.      Thank you for the request for a consultation.  Appreciate hospitalist taking care of this patient.         DANNI KAYE MD             D: 2018   T: 2018   MT: LAURA      Name:     HARJEET IVEY   MRN:      [id number]        Account:       [id number]   :      1935           Consult Date:  2018      Document: [id number]       cc: Kishore Valladares MD

## 2018-03-18 NOTE — PROGRESS NOTES
[facility]    Hospitalist Progress Note  Britton Nguyen MD    Assessment & Plan      82-year-old  lady, with past medical history notable for colon cancer and metastatic breast cancer, chronic anemia, who presented to the [facility] ER on 3/17/18, due to complaints of worsening generalized weakness since Dec 2017, dry cough for several months and dyspnea for 1-2 weeks. Work up done on 3/17/18 revealed, normal CMP. CBC revealed, Hb 9.9, WBC 6.3 and Plts 236. Lipase was 197. Troponin was <0.015. TSH was 0.85. Influenza A/B antigen screen was negative. UA revealed, moderate leucocyte esterase, WBC 27, RBC 2, few bacteria. Chest x-rays on 3/17/18 was normal. CT Head on 3/17/18 revealed, diffuse cerebral volume loss and cerebral white matter changes, consistent with chronic small vessel ischemic disease. No evidence for acute intracranial pathology. Bilat Venous US on 3/17/18 was negative for DVT. CT Chest on 3/17/18 revealed, no pulmonary embolism demonstrated. No thoracic aortic dissection or aneurysm.  Patchy right lower lobe infiltrates and fairly extensive right middle lobe consolidation. Elevated left hemidiaphragm. In the ER, the patient was initially saturating 95% on room air, then dipped to 88%, requiring treatment with 2 liters of oxygen.     1.  Right middle lobe community acquired pneumonia, with hypoxic respiratory failure: Blood and sputum cultures were sent. Continue 2L O2 via NC. Continue IV Rocephin and IV Azithromycin. For Alb nebs prn. For saccharomyces bid.    2.  Abnormal Urinalysis:  Urine culture was sent. Continue IV Rocephin and IV N/Saline.     3. Prediabetes: HbA1C was 6.4% on 3/18/18.     4.  Metastatic breast cancer:  She is currently undergoing chemotherapy with Herceptin, Taxol and Zometa under the care of Dr. Kishore Valladares.   Her last chemotherapy was on 03/15/2018. Appreciate Oncologist input       5.  Anemia of chronic disorder:  Hemoglobin is  9.9-->9.4g/dl on 3/18/18.     6.  Physical deconditioning/Generalized weakness: For PT/OT review.       DVT Prophylaxis: Enoxaparin (Lovenox) SQ  Code Status: Full Code    Disposition: Expected discharge in 1-2 days.      Interval History   The pt reported having more strength since her admission. She has a cough productive of white sputum.    -Data reviewed today: I reviewed all new labs and imaging results over the last 24 hours. I personally reviewed no images or EKG's today.    Physical Exam   Temp: 97.2  F (36.2  C) Temp src: Axillary BP: 142/81 Pulse: 76 Heart Rate: 76 Resp: 16 SpO2: 95 % O2 Device: None (Room air) Oxygen Delivery: 2 LPM  Vitals:    03/17/18 1158 03/18/18 0644   Weight: 68.5 kg (151 lb) 71.7 kg (158 lb)     Vital Signs with Ranges  Temp:  [97.2  F (36.2  C)-98.5  F (36.9  C)] 97.2  F (36.2  C)  Pulse:  [76-92] 76  Heart Rate:  [76-91] 76  Resp:  [16-20] 16  BP: (125-157)/(66-81) 142/81  SpO2:  [88 %-98 %] 95 %  I/O last 3 completed shifts:  In: 1150 [P.O.:550; I.V.:600]  Out: -     Constitutional: Elderly white female, awake, cooperative, no apparent distress, O2 Sats 97% on RA  Respiratory: BS vesicular bilaterally, but reduced in both right middle and lower lobes,   no crackles or wheezing  Cardiovascular: S1 and S2, reg, no murmur noted  GI: Soft, non-distended, non-tender, no masses, BS present+  Skin/Integumen: No rashes  Extremities: No pedal edema    Medications     sodium chloride 100 mL/hr at 03/18/18 0817       acetaminophen  1,000 mg Oral TID     cholecalciferol  1,000 Units Oral Daily     enoxaparin  40 mg Subcutaneous Q24H     cefTRIAXone  2 g Intravenous Q24H     azithromycin  250 mg Intravenous Q24H       Data     Recent Labs  Lab 03/18/18  0800 03/17/18  1750 03/17/18  1235   WBC 6.3  --  6.3   HGB 9.4*  --  9.9*   MCV 96  --  97    210 236     --  133   POTASSIUM 3.9  --  4.3   CHLORIDE 106  --  100   CO2 22  --  25   BUN 15  --  15   CR 0.71 0.78 0.78   ANIONGAP  8  --  8   ALEJANDRINA 8.1*  --  8.6   *  --  146*   ALBUMIN  --   --  3.4   PROTTOTAL  --   --  7.4   BILITOTAL  --   --  0.8   ALKPHOS  --   --  70   ALT  --   --  23   AST  --   --  37   LIPASE  --   --  197   TROPI  --   --  <0.015       Recent Results (from the past 24 hour(s))   XR Chest 2 Views    Narrative    CHEST TWO VIEWS  3/17/2018 1:18 PM     COMPARISON: Two view chest x-ray 9/26/2017.    HISTORY: Cough    FINDINGS: The cardiac silhouette, pulmonary vasculature, lungs and  pleural spaces are within normal limits.      Impression    IMPRESSION: Clear lungs.    KEE RIOS MD   CT Head w/o Contrast    Narrative    CT OF THE HEAD WITHOUT CONTRAST 3/17/2018 2:20 PM     COMPARISON: None    HISTORY: Brief aphasia, evaluate bleed or mass.    TECHNIQUE: 5 mm thick axial CT images of the head were acquired  without IV contrast material.    FINDINGS: There is mild diffuse cerebral volume loss. There are subtle  patchy areas of decreased density in the cerebral white matter  bilaterally that are consistent with sequela of chronic small vessel  ischemic disease.    The ventricles and basal cisterns are within normal limits in  configuration given the degree of cerebral volume loss. There is no  midline shift. There are no extra-axial fluid collections.    No intracranial hemorrhage, mass or recent infarct.    The visualized paranasal sinuses are well-aerated. There is no  mastoiditis. There are no fractures of the visualized bones.      Impression    IMPRESSION: Diffuse cerebral volume loss and cerebral white matter  changes consistent with chronic small vessel ischemic disease. No  evidence for acute intracranial pathology.        Radiation dose for this scan was reduced using automated exposure  control, adjustment of the mA and/or kV according to patient size, or  iterative reconstruction technique    KEE RIOS MD   US Lower Extremity Venous Duplex Bilateral    Narrative    ULTRASOUND VENOUS LOWER  EXTREMITY BILATERAL 3/17/2018 4:34 PM     HISTORY: Lower extremity edema, breast cancer, evaluate for DVT.      COMPARISON: None.    TECHNIQUE: Ultrasound gray scale, Color Doppler flow, and spectral  Doppler waveform analysis performed.    FINDINGS: The bilateral common femoral, superficial femoral, popliteal  and posterior tibial veins are patent and fully compressible and  demonstrate normal venous Doppler flow. The visualized greater  saphenous veins are negative for thrombus. Probable left Baker's cyst  measuring 4.7 x 3.2 x 1.9 cm.      Impression    IMPRESSION: No DVT demonstrated.     JORGE DRISCOLL MD   CT Chest Pulmonary Embolism w Contrast    Narrative    CT CHEST AND PULMONARY EMBOLISM ANGIOGRAM  3/17/2018 5:18 PM     HISTORY: Hypoxia.    COMPARISON: None.    TECHNIQUE: Volumetric helical acquisition of CT images of the chest  from the lung apices to the kidneys were acquired after the  administration of 59 mL Isovue-370  IV contrast. Radiation dose for  this scan was reduced using automated exposure control, adjustment of  the mA and/or kV according to patient size, or iterative  reconstruction technique.    FINDINGS: There is no pulmonary embolism. Elevated left hemidiaphragm.  Patchy infiltrates at the right base. The heart may be generous  particularly the right side. The esophagus is patulous. Left breast  reconstruction changes. Thoracic aorta is atherosclerotic without  evidence of dissection or aneurysm. There is no pleural or pericardial  effusion.  There is no pneumothorax. Adrenal glands are normal.  Remainder of the visualized upper abdomen is unremarkable.      Impression    IMPRESSION:   1. No pulmonary embolism demonstrated.  2. No thoracic aortic dissection or aneurysm.   3. Patchy right lower lobe infiltrates and fairly extensive right  middle lobe consolidation.  4. Elevated left hemidiaphragm.      JORGE DRISCOLL MD

## 2018-03-18 NOTE — PLAN OF CARE
Problem: Patient Care Overview  Goal: Plan of Care/Patient Progress Review  Outcome: No Change  Pt is AAOx4, VSS, Frequent cough, productive with white-yellow milky sputum, sputum culture collected. Will continue to monitor.

## 2018-03-18 NOTE — PLAN OF CARE
Problem: Patient Care Overview  Goal: Plan of Care/Patient Progress Review  Outcome: No Change  1734-1961: pt alert and oriented. Lung sounds diminished. Productive intermittent coughing. Vital sings stable. Pt denies pain. Will continue to monitor.

## 2018-03-18 NOTE — CONSULTS
Please see dictated consult note from same date.  Patient of Dr. Valladares, metastatic breast cancer admitted with weakness, pneumonia, ? UTI and cough.  Is on chemotherapy, with paclitaxel and trastuzumab, has not been neutropenic.    PLAN:  1. IV ATBX  2. PT/ OT  3. Chemo on hold( preplanned), is due to see Dr. Valladares end of the month.  4. Disease showed good response per scan in February.    Greatly appreciate care by hospital MDs and team.

## 2018-03-19 ENCOUNTER — APPOINTMENT (OUTPATIENT)
Dept: PHYSICAL THERAPY | Facility: CLINIC | Age: 83
DRG: 193 | End: 2018-03-19
Attending: INTERNAL MEDICINE
Payer: MEDICARE

## 2018-03-19 PROCEDURE — 40000193 ZZH STATISTIC PT WARD VISIT: Performed by: PHYSICAL THERAPIST

## 2018-03-19 PROCEDURE — 25000128 H RX IP 250 OP 636: Performed by: INTERNAL MEDICINE

## 2018-03-19 PROCEDURE — 12000007 ZZH R&B INTERMEDIATE

## 2018-03-19 PROCEDURE — 27210995 ZZH RX 272: Performed by: INTERNAL MEDICINE

## 2018-03-19 PROCEDURE — 25000132 ZZH RX MED GY IP 250 OP 250 PS 637: Mod: GY | Performed by: INTERNAL MEDICINE

## 2018-03-19 PROCEDURE — A9270 NON-COVERED ITEM OR SERVICE: HCPCS | Mod: GY | Performed by: INTERNAL MEDICINE

## 2018-03-19 PROCEDURE — 99232 SBSQ HOSP IP/OBS MODERATE 35: CPT | Performed by: INTERNAL MEDICINE

## 2018-03-19 PROCEDURE — 97161 PT EVAL LOW COMPLEX 20 MIN: CPT | Mod: GP | Performed by: PHYSICAL THERAPIST

## 2018-03-19 RX ADMIN — AZITHROMYCIN MONOHYDRATE 250 MG: 500 INJECTION, POWDER, LYOPHILIZED, FOR SOLUTION INTRAVENOUS at 16:55

## 2018-03-19 RX ADMIN — Medication 250 MG: at 21:50

## 2018-03-19 RX ADMIN — SODIUM CHLORIDE: 9 INJECTION, SOLUTION INTRAVENOUS at 06:09

## 2018-03-19 RX ADMIN — ACETAMINOPHEN 500 MG: 500 TABLET, FILM COATED ORAL at 21:50

## 2018-03-19 RX ADMIN — ENOXAPARIN SODIUM 40 MG: 40 INJECTION SUBCUTANEOUS at 18:01

## 2018-03-19 RX ADMIN — ACETAMINOPHEN 1000 MG: 500 TABLET, FILM COATED ORAL at 08:38

## 2018-03-19 RX ADMIN — CHOLECALCIFEROL TAB 25 MCG (1000 UNIT) 1000 UNITS: 25 TAB at 08:39

## 2018-03-19 RX ADMIN — ZOLPIDEM TARTRATE 5 MG: 5 TABLET, FILM COATED ORAL at 21:50

## 2018-03-19 RX ADMIN — CEFTRIAXONE SODIUM 2 G: 10 INJECTION, POWDER, FOR SOLUTION INTRAVENOUS at 18:01

## 2018-03-19 NOTE — PROGRESS NOTES
[facility]    Hospitalist Progress Note  Britton Nguyen MD    Assessment & Plan      82-year-old  lady, with past medical history notable for colon cancer and metastatic breast cancer, chronic anemia, who presented to the [facility] ER on 3/17/18, due to complaints of worsening generalized weakness since Dec 2017, dry cough for several months and dyspnea for 1-2 weeks. Work up done on 3/17/18 revealed, normal CMP. CBC revealed, Hb 9.9, WBC 6.3 and Plts 236. Lipase was 197. Troponin was <0.015. TSH was 0.85. Influenza A/B antigen screen was negative. UA revealed, moderate leucocyte esterase, WBC 27, RBC 2, few bacteria. Chest x-rays on 3/17/18 was normal. CT Head on 3/17/18 revealed, diffuse cerebral volume loss and cerebral white matter changes, consistent with chronic small vessel ischemic disease. No evidence for acute intracranial pathology. Bilat Venous US on 3/17/18 was negative for DVT. CT Chest on 3/17/18 revealed, no pulmonary embolism demonstrated. No thoracic aortic dissection or aneurysm.  Patchy right lower lobe infiltrates and fairly extensive right middle lobe consolidation. Elevated left hemidiaphragm. In the ER, the patient was initially saturating 95% on room air, then dipped to 88%, requiring treatment with 2 liters of oxygen.     1.  Right middle lobe community acquired pneumonia, with hypoxic respiratory failure: Blood cultures on 3/17/18 and sputum culture on 3/181/8 were both negative. Continue 2L O2 via NC. Continue IV Rocephin and IV Azithromycin. For Alb nebs prn. Continue saccharomyces bid. Switch to oral antibiotics at discharge.    2.  Abnormal Urinalysis:  Urine culture on 3/17/18 revealed urogenital jackson.    3. Prediabetes: HbA1C was 6.4% on 3/18/18.     4.  Metastatic breast cancer:  She is currently undergoing chemotherapy with Herceptin, Taxol and Zometa under the care of Dr. Kishore Valladares.   Her last chemotherapy was on 03/15/2018.  Appreciate Oncologist input. For outpt FU with Oncologist.     5.  Anemia of chronic disorder:  Hemoglobin is 9.9-->9.4g/dl on 3/18/18.     6.  Physical deconditioning/Generalized weakness: Continue PT/OT review.       DVT Prophylaxis: Enoxaparin (Lovenox) SQ  Code Status: Full Code    Disposition: Expected discharge in 1 day.      Interval History   The pt reported ambulate today in her room. She still has a poor appetite. She reported coughing up a lump of white sputum today. No dyspnea at rest or with exertion.    -Data reviewed today: I reviewed all new labs and imaging results over the last 24 hours. I personally reviewed no images or EKG's today.    Physical Exam   Temp: 97.6  F (36.4  C) Temp src: Oral BP: 152/84 Pulse: 79 Heart Rate: 72 Resp: 16 SpO2: 94 % O2 Device: None (Room air)    Vitals:    03/17/18 1158 03/18/18 0644 03/19/18 0629   Weight: 68.5 kg (151 lb) 71.7 kg (158 lb) 72.1 kg (159 lb)     Vital Signs with Ranges  Temp:  [97.3  F (36.3  C)-97.8  F (36.6  C)] 97.6  F (36.4  C)  Pulse:  [79] 79  Heart Rate:  [72-82] 72  Resp:  [16] 16  BP: (135-152)/(75-84) 152/84  SpO2:  [94 %-97 %] 94 %  I/O last 3 completed shifts:  In: 2122 [P.O.:720; I.V.:1402]  Out: 400 [Urine:400]    Constitutional: Elderly white female, awake, cooperative, no apparent distress, O2 Sats 94% on RA  Respiratory: BS vesicular bilaterally, but reduced in both right middle and lower zones,   Few right basal insp crackles, no wheezing  Cardiovascular: S1 and S2, reg, no murmur noted  GI: Soft, non-distended, non-tender, no masses, BS present+  Skin/Integumen: No rashes  Extremities: left ankle edema 1+, mild right ankle edema    Medications       saccharomyces boulardii  250 mg Oral BID     acetaminophen  1,000 mg Oral TID     cholecalciferol  1,000 Units Oral Daily     enoxaparin  40 mg Subcutaneous Q24H     cefTRIAXone  2 g Intravenous Q24H     azithromycin  250 mg Intravenous Q24H       Data     Recent Labs  Lab 03/18/18  0800  03/17/18  1750 03/17/18  1235   WBC 6.3  --  6.3   HGB 9.4*  --  9.9*   MCV 96  --  97    210 236     --  133   POTASSIUM 3.9  --  4.3   CHLORIDE 106  --  100   CO2 22  --  25   BUN 15  --  15   CR 0.71 0.78 0.78   ANIONGAP 8  --  8   ALEJANDRINA 8.1*  --  8.6   *  --  146*   ALBUMIN  --   --  3.4   PROTTOTAL  --   --  7.4   BILITOTAL  --   --  0.8   ALKPHOS  --   --  70   ALT  --   --  23   AST  --   --  37   LIPASE  --   --  197   TROPI  --   --  <0.015       No results found for this or any previous visit (from the past 24 hour(s)).

## 2018-03-19 NOTE — PLAN OF CARE
Problem: Patient Care Overview  Goal: Plan of Care/Patient Progress Review  OT: order received, chart reviewed and contact made with patient. Pt gave full report of her baseline which includes being indep all ADL, IADL.  Discharge Planner OT   Patient plan for discharge: home  Current status: per PT eval pt is indep with mobility. Pt is A & O x 4, she gave accurate report of reason for hospitalization and medical POC. She is performing self-cares indep in room,using phone and communicating indep.  No skilled OT intervention indicated at this time. Pt encouraged to perform BUE AROM ex's and amb on own to improve and/or maintain strength during hospitalization. OT order to be completed.  Barriers to return to prior living situation: none noted  Recommendations for discharge: home  Rationale for recommendations: pt appears to be at or near baseline level of function except for continue cough (although improving)       Entered by: Terell Escalante 03/19/2018 2:42 PM

## 2018-03-19 NOTE — PLAN OF CARE
Problem: Pneumonia (Adult)  Goal: Signs and Symptoms of Listed Potential Problems Will be Absent, Minimized or Managed (Pneumonia)  Signs and symptoms of listed potential problems will be absent, minimized or managed by discharge/transition of care (reference Pneumonia (Adult) CPG).   Outcome: Declining  Pt A/Ox4. VSS. Independent. Reports pain 2/10, declines pain medication. Regular diet tolerated. Edema to bilateral LE and RUE decreasing throughout shift. LS coarse. Frequent cough.

## 2018-03-19 NOTE — PLAN OF CARE
Problem: Patient Care Overview  Goal: Plan of Care/Patient Progress Review  Outcome: Improving  Patient is A&O, but forgetful, VSS on RA, regular diet, up with SBA, IVF, voiding adequately in the bathroom. Pain with productive cough. Will continue to monitor

## 2018-03-19 NOTE — PROGRESS NOTES
03/19/18 1200   Quick Adds   Type of Visit Initial PT Evaluation   Living Environment   Lives With alone   Living Arrangements house   Home Accessibility stairs to enter home;stairs within home   Transportation Available car;family or friend will provide   Self-Care   Usual Activity Tolerance good   Current Activity Tolerance moderate   Regular Exercise (teachgarry dance; including wc dance)   Equipment Currently Used at Home none   Activity/Exercise/Self-Care Comment Patient lives alone in home; drives normally but hasn't since she hasn't been feeling well due to chemotherapy treatments.  Daughter has been driving her.  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    Functional Level Prior   Ambulation 0-->independent   Transferring 0-->independent   Toileting 0-->independent   Bathing 0-->independent   Dressing 0-->independent   Eating 0-->independent   Communication 0-->understands/communicates without difficulty   Swallowing 0-->swallows foods/liquids without difficulty   Cognition 0 - no cognition issues reported   Fall history within last six months no   Which of the above functional risks had a recent onset or change? 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  Has been recieving chemo for breast ca.  Previous history of colon ca.    Precautions/Limitations no known precautions/limitations   General Info Comments fall risk has been lifted; patient being allowed up alone   Cognitive Status Examination   Orientation orientation to person, place and time  "  Level of Consciousness alert   Follows Commands and Answers Questions 100% of the time   Personal Safety and Judgment intact   Pain Assessment   Patient Currently in Pain No   Integumentary/Edema   Integumentary/Edema Comments right U/E edema noted; per patient nursing is aware   Posture    Posture Not impaired   Range of Motion (ROM)   ROM Quick Adds No deficits were identified   Strength   Manual Muscle Testing Quick Adds No deficits were identified   Bed Mobility   Bed Mobility Comments independent with sit to and from supine   Transfer Skills   Transfer Comments independent with sit to and from stand   Gait   Gait Comments observed patient ambulating independently in room; no balance deficits noted.  Declined going out of room secondary to being wary with her weakned immunity.   Balance   Balance Comments no gross LOB noted   Sensory Examination   Sensory Perception no deficits were identified   Coordination   Coordination no deficits were identified   Muscle Tone   Muscle Tone no deficits were identified   Clinical Impression   Criteria for Skilled Therapeutic Intervention evaluation only;no problems identified which require skilled intervention   PT Diagnosis assessing for decreased functional mobility with diagnosis of pneumonia   Influenced by the following impairments cough, feels slightly below baseline in this respect   Functional limitations due to impairments coughs with activity   Clinical Presentation Stable/Uncomplicated   Clinical Presentation Rationale per clinical judgment   Clinical Decision Making (Complexity) Low complexity   Anticipated Discharge Disposition Home   Risk & Benefits of therapy have been explained Yes   Patient, Family & other staff in agreement with plan of care Yes   [facility] AM-PAC  \"6 Clicks\" V.2 Basic Mobility Inpatient Short Form   1. Turning from your back to your side while in a flat bed without using bedrails? 4 - None   2. Moving from lying on your back to " sitting on the side of a flat bed without using bedrails? 4 - None   3. Moving to and from a bed to a chair (including a wheelchair)? 4 - None   4. Standing up from a chair using your arms (e.g., wheelchair, or bedside chair)? 4 - None   5. To walk in hospital room? 4 - None   6. Climbing 3-5 steps with a railing? 4 - None   Basic Mobility Raw Score (Score out of 24.Lower scores equate to lower levels of function) 24   Total Evaluation Time   Total Evaluation Time (Minutes) 30

## 2018-03-19 NOTE — PLAN OF CARE
Problem: Patient Care Overview  Goal: Plan of Care/Patient Progress Review  PT:  Patient admitted with diagnosis of pneumonia and possible UTI.  Feels near baseline in her mobility and has been allowed to be up in room I'lly.  Evaluated gait with no deficits note; patient declining to ambulate outside of room secondary to being immunosurpressed; having chemo treatments for breast cancer which were just put on hold as patient wants to attend a wedding this coming Saturday.  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  Feels no deficits in balance.  Does have cough.  Inquiring into strengthening exercises for pneumonia.  Encouraged to continue incentive spirometer and increase the walking she does in the room.  Patient also planning to add exercises she does with her  dance class.  Agree with plan.  No further PT indicated at this time.  Evaluation only performed.  Discharge Planner PT   Patient plan for discharge: home  Current status: Independent in room.  No balance deficits noted. No extremety weakness noted  Barriers to return to prior living situation: None noted  Recommendations for discharge: Home  Rationale for recommendations: Patient near baseline for mobility.  Coughing from pneumonia but no deficits noted that would limit patient from returning home alone.       Entered by: Yoli Warren 03/19/2018 12:13 PM   Physical Therapy Discharge Summary    Reason for therapy discharge:    All goals and outcomes met, no further needs identified.    Progress towards therapy goal(s). See goals on Care Plan in [facility] electronic health record for goal details.  Goals not set, eval only, no PT indicated    Therapy recommendation(s):    No further therapy is recommended.

## 2018-03-19 NOTE — PROGRESS NOTES
[facility] Hematology Progress Note     Primary Oncologist/Hematologist:  Dr. Valladares [place]          Assessment and Plan:     IMPRESSION  1. Right middle/lower lobe pneumonia, not neutropenic, but has been on recent chemotherapy. On IV Rocephin and Azithromycin. Continue incentive spirometry, PT, OT.   2.  Generalized weakness and near-syncope.  Feels slightly stronger today.    3.  Abnormal urinalysis now on ceftriaxone.   4.  Hypoxia has since resolved.  No pulmonary embolism, no DVT in legs.   5. Metastatic breast cancer, receiving taxol and herceptin (last on 3/15/18). Treatment on hold this week so that she can attend an wedding this upcoming Saturday.  Recent imaging has suggested she is responding to treatment. She has F/U with  later this month.   6. Anemia secondary to chemotherapy:  Hemoglobins from office have been running in the low to mid 10 range while on chemo. Monitor closely  7. Prior history colon cancer s/p resection and chemo. No evidence of recurrence.     DVT prophylaxis: Enoxaparin SQ  Code status: FULL CODE.          Interval History:   Non-productive cough.               Review of Systems:   The 5 point Review of Systems is negative other than noted in the HPI             Medications:   Scheduled Medications    saccharomyces boulardii  250 mg Oral BID     acetaminophen  1,000 mg Oral TID     cholecalciferol  1,000 Units Oral Daily     enoxaparin  40 mg Subcutaneous Q24H     cefTRIAXone  2 g Intravenous Q24H     azithromycin  250 mg Intravenous Q24H     PRN Medications  guaiFENesin-dextromethorphan, diphenhydrAMINE (BENADRYL) capsule 25 mg, guaiFENesin, zolpidem (AMBIEN) tablet 5 mg, naloxone, melatonin, potassium chloride, potassium chloride, potassium chloride, potassium chloride with lidocaine, potassium chloride, magnesium sulfate, oxyCODONE IR, senna-docusate **OR** senna-docusate, polyethylene glycol, ondansetron **OR** ondansetron, prochlorperazine **OR**  "prochlorperazine **OR** prochlorperazine, albuterol               Physical Exam:   Vitals were reviewed  Blood pressure 152/84, pulse 79, temperature 97.6  F (36.4  C), temperature source Oral, resp. rate 16, height 1.549 m (5' 1\"), weight 72.1 kg (159 lb), SpO2 94 %, not currently breastfeeding.  Wt Readings from Last 4 Encounters:   03/19/18 72.1 kg (159 lb)   09/26/17 71.9 kg (158 lb 9.6 oz)       I/O last 3 completed shifts:  In: 2122 [P.O.:720; I.V.:1402]  Out: 400 [Urine:400]      Constitutional: Awake, alert, cooperative, no apparent distress   Lungs: diminished, no crackles or wheezing   Cardiovascular: Regular rate and rhythm, normal S1 and S2, and no murmur noted   Abdomen: Normal bowel sounds, soft, non-distended, non-tender   Skin: No rashes, no cyanosis   Other:               Data:   All laboratory data and imaging studies reviewed.    CMP  Recent Labs  Lab 03/18/18  0800 03/17/18  1750 03/17/18  1235     --  133   POTASSIUM 3.9  --  4.3   CHLORIDE 106  --  100   CO2 22  --  25   ANIONGAP 8  --  8   *  --  146*   BUN 15  --  15   CR 0.71 0.78 0.78   GFRESTIMATED 79 70 71   GFRESTBLACK >90 85 86   ALEJANDRINA 8.1*  --  8.6   MAG 2.1  --   --    PROTTOTAL  --   --  7.4   ALBUMIN  --   --  3.4   BILITOTAL  --   --  0.8   ALKPHOS  --   --  70   AST  --   --  37   ALT  --   --  23     CBC  Recent Labs  Lab 03/18/18  0800 03/17/18  1750 03/17/18  1235   WBC 6.3  --  6.3   RBC 2.83*  --  3.04*   HGB 9.4*  --  9.9*   HCT 27.1*  --  29.6*   MCV 96  --  97   MCH 33.2*  --  32.6   MCHC 34.7  --  33.4   RDW 14.7  --  15.1*    210 236     INRNo lab results found in last 7 days.        Quyen Adler CNP  Nurse Practitioner  [facility]  [phone]      "

## 2018-03-20 LAB
BACTERIA SPEC CULT: ABNORMAL
BACTERIA SPEC CULT: ABNORMAL
BACTERIA SPEC CULT: NORMAL
CREAT SERPL-MCNC: 0.77 MG/DL (ref 0.52–1.04)
GFR SERPL CREATININE-BSD FRML MDRD: 72 ML/MIN/1.7M2
PLATELET # BLD AUTO: 239 10E9/L (ref 150–450)
SPECIMEN SOURCE: ABNORMAL
SPECIMEN SOURCE: NORMAL

## 2018-03-20 PROCEDURE — 99232 SBSQ HOSP IP/OBS MODERATE 35: CPT | Performed by: INTERNAL MEDICINE

## 2018-03-20 PROCEDURE — 36415 COLL VENOUS BLD VENIPUNCTURE: CPT | Performed by: INTERNAL MEDICINE

## 2018-03-20 PROCEDURE — 25000132 ZZH RX MED GY IP 250 OP 250 PS 637: Mod: GY | Performed by: INTERNAL MEDICINE

## 2018-03-20 PROCEDURE — 12000007 ZZH R&B INTERMEDIATE

## 2018-03-20 PROCEDURE — 40000257 ZZH STATISTIC CONSULT NO CHARGE VASC ACCESS

## 2018-03-20 PROCEDURE — A9270 NON-COVERED ITEM OR SERVICE: HCPCS | Mod: GY | Performed by: INTERNAL MEDICINE

## 2018-03-20 PROCEDURE — 82565 ASSAY OF CREATININE: CPT | Performed by: INTERNAL MEDICINE

## 2018-03-20 PROCEDURE — 40000556 ZZH STATISTIC PERIPHERAL IV START W US GUIDANCE

## 2018-03-20 PROCEDURE — 85049 AUTOMATED PLATELET COUNT: CPT | Performed by: INTERNAL MEDICINE

## 2018-03-20 PROCEDURE — 25000128 H RX IP 250 OP 636: Performed by: INTERNAL MEDICINE

## 2018-03-20 RX ORDER — LEVOFLOXACIN 250 MG/1
500 TABLET, FILM COATED ORAL DAILY
Status: DISCONTINUED | OUTPATIENT
Start: 2018-03-21 | End: 2018-03-20

## 2018-03-20 RX ORDER — LEVOFLOXACIN 250 MG/1
500 TABLET, FILM COATED ORAL DAILY
Status: DISCONTINUED | OUTPATIENT
Start: 2018-03-20 | End: 2018-03-21 | Stop reason: HOSPADM

## 2018-03-20 RX ORDER — CEFTRIAXONE 2 G/1
2 INJECTION, POWDER, FOR SOLUTION INTRAMUSCULAR; INTRAVENOUS EVERY 24 HOURS
Status: DISCONTINUED | OUTPATIENT
Start: 2018-03-20 | End: 2018-03-20

## 2018-03-20 RX ADMIN — DIPHENHYDRAMINE HYDROCHLORIDE 25 MG: 25 CAPSULE ORAL at 01:31

## 2018-03-20 RX ADMIN — ACETAMINOPHEN 1000 MG: 500 TABLET, FILM COATED ORAL at 08:53

## 2018-03-20 RX ADMIN — ZOLPIDEM TARTRATE 5 MG: 5 TABLET, FILM COATED ORAL at 22:05

## 2018-03-20 RX ADMIN — Medication 250 MG: at 20:38

## 2018-03-20 RX ADMIN — ENOXAPARIN SODIUM 40 MG: 40 INJECTION SUBCUTANEOUS at 17:06

## 2018-03-20 RX ADMIN — CHOLECALCIFEROL TAB 25 MCG (1000 UNIT) 1000 UNITS: 25 TAB at 08:53

## 2018-03-20 RX ADMIN — LEVOFLOXACIN 500 MG: 250 TABLET, FILM COATED ORAL at 18:17

## 2018-03-20 NOTE — PROGRESS NOTES
[facility] Progress Note     Primary Oncologist/Hematologist:  Dr. Valladares [place]          Assessment and Plan:     IMPRESSION  1. Right middle/lower lobe pneumonia, not neutropenic, but has been on recent chemotherapy. On IV Rocephin and Azithromycin. Continue incentive spirometry, PT, OT.   Plans in place to DC tomorrow on oral antibiotics.  2.  Generalized weakness.  Feels slightly stronger today.  Ambulating in room  3.  Abnormal urinalysis now on ceftriaxone.   4.  Hypoxia has since resolved.  No pulmonary embolism, no DVT in legs.   5. Metastatic breast cancer, receiving taxol and herceptin (last on 3/15/18). Treatment on hold this week so that she can attend an wedding this upcoming Saturday.  Recent imaging has suggested she is responding to treatment (we reviewed these results again today). She has F/U with  later this month.   6. Anemia secondary to chemotherapy:  Hemoglobins from office have been running in the low to mid 10 range while on chemo. Monitor closely  7. Prior history colon cancer s/p resection and chemo. No evidence of recurrence.      DVT prophylaxis: Enoxaparin SQ  Code status: FULL CODE.            Interval History:   Feeling better. Still weak. Still coughing              Review of Systems:   The 5 point Review of Systems is negative other than noted in the HPI             Medications:   Scheduled Medications    [START ON 3/21/2018] levofloxacin  500 mg Oral Daily     saccharomyces boulardii  250 mg Oral BID     acetaminophen  1,000 mg Oral TID     cholecalciferol  1,000 Units Oral Daily     enoxaparin  40 mg Subcutaneous Q24H     cefTRIAXone  2 g Intravenous Q24H     azithromycin  250 mg Intravenous Q24H     PRN Medications  guaiFENesin-dextromethorphan, diphenhydrAMINE (BENADRYL) capsule 25 mg, guaiFENesin, zolpidem (AMBIEN) tablet 5 mg, naloxone, melatonin, potassium chloride, potassium chloride, potassium chloride, potassium chloride with lidocaine,  "potassium chloride, magnesium sulfate, oxyCODONE IR, senna-docusate **OR** senna-docusate, polyethylene glycol, ondansetron **OR** ondansetron, prochlorperazine **OR** prochlorperazine **OR** prochlorperazine, albuterol               Physical Exam:   Vitals were reviewed  Blood pressure 146/87, pulse 74, temperature 97.8  F (36.6  C), temperature source Oral, resp. rate 16, height 1.549 m (5' 1\"), weight 71.2 kg (157 lb), SpO2 95 %, not currently breastfeeding.  Wt Readings from Last 4 Encounters:   03/20/18 71.2 kg (157 lb)   09/26/17 71.9 kg (158 lb 9.6 oz)       I/O last 3 completed shifts:  In: 480 [P.O.:480]  Out: -       Constitutional: Awake, alert, cooperative, no apparent distress   Lungs: Clear to auscultation bilaterally, no crackles or wheezing   Cardiovascular: Regular rate and rhythm, normal S1 and S2, and no murmur noted   Abdomen: Normal bowel sounds, soft, non-distended, non-tender   Skin: No rashes, no cyanosis, no edema   Other:               Data:   All laboratory data and imaging studies reviewed.    CMP  Recent Labs  Lab 03/20/18  0630 03/18/18  0800 03/17/18  1750 03/17/18  1235   NA  --  136  --  133   POTASSIUM  --  3.9  --  4.3   CHLORIDE  --  106  --  100   CO2  --  22  --  25   ANIONGAP  --  8  --  8   GLC  --  193*  --  146*   BUN  --  15  --  15   CR 0.77 0.71 0.78 0.78   GFRESTIMATED 72 79 70 71   GFRESTBLACK 87 >90 85 86   ALEJANDRINA  --  8.1*  --  8.6   MAG  --  2.1  --   --    PROTTOTAL  --   --   --  7.4   ALBUMIN  --   --   --  3.4   BILITOTAL  --   --   --  0.8   ALKPHOS  --   --   --  70   AST  --   --   --  37   ALT  --   --   --  23     CBC  Recent Labs  Lab 03/20/18  0630 03/18/18  0800 03/17/18  1750 03/17/18  1235   WBC  --  6.3  --  6.3   RBC  --  2.83*  --  3.04*   HGB  --  9.4*  --  9.9*   HCT  --  27.1*  --  29.6*   MCV  --  96  --  97   MCH  --  33.2*  --  32.6   MCHC  --  34.7  --  33.4   RDW  --  14.7  --  15.1*    201 210 236     INRNo lab results found in last 7 " days.        Quyen Adler CNP  Nurse Practitioner  [facility]  [phone]

## 2018-03-20 NOTE — PROVIDER NOTIFICATION
MD Notification    Notified Person: MD    Notified Person Name: Dr. Yang    Notification Date/Time: 1750    Notification Interaction: phone call    Purpose of Notification: IV team unable to re-start IV access    Orders Received: DC Rocephin IV, re-schedule Levaquin po to today instead of tomorrow.    Comments: pharmacy notified

## 2018-03-20 NOTE — PROGRESS NOTES
[facility]    Hospitalist Progress Note    Assessment & Plan         82-year-old  lady, with past medical history notable for colon cancer and metastatic breast cancer, chronic anemia, who presented to the [facility] ER on 3/17/18, due to complaints of worsening generalized weakness since Dec 2017, dry cough for several months and dyspnea for 1-2 weeks. Work up done on 3/17/18 revealed, normal CMP. CBC revealed, Hb 9.9, WBC 6.3 and Plts 236. Lipase was 197. Troponin was <0.015. TSH was 0.85. Influenza A/B antigen screen was negative. UA revealed, moderate leucocyte esterase, WBC 27, RBC 2, few bacteria. Chest x-rays on 3/17/18 was normal. CT Head on 3/17/18 revealed, diffuse cerebral volume loss and cerebral white matter changes, consistent with chronic small vessel ischemic disease. No evidence for acute intracranial pathology. Bilat Venous US on 3/17/18 was negative for DVT. CT Chest on 3/17/18 revealed, no pulmonary embolism demonstrated. No thoracic aortic dissection or aneurysm.  Patchy right lower lobe infiltrates and fairly extensive right middle lobe consolidation. Elevated left hemidiaphragm. In the ER, the patient was initially saturating 95% on room air, then dipped to 88%, requiring treatment with 2 liters of oxygen.     Right middle lobe community acquired pneumonia, with hypoxic respiratory failure: Blood cultures on 3/17/18 and sputum culture on 3/181/8 were both negative. Continue 2L O2 via NC. Continue IV Rocephin and IV Azithromycin. For Alb nebs as needed. Continue saccharomyces twice daily.   -still not feeling well with labored breathing and coughing, says she is not safe to go home in current condition  -keep overnight and plan to change to oral levaquin tomorrow for additional 7 days    Abnormal Urinalysis likely asymptomatic colonization. Urine culture on 3/17/18 revealed urogenital jackson and coag negative staph that is likely contaminate with repeat UA  showing no WBC's.  -Levaquin would cover this as well but not thought to be a UTI    Prediabetes: HbA1C was 6.4% on 3/18/18.     Metastatic breast cancer:  She is currently undergoing chemotherapy with Herceptin, Taxol and Zometa under the care of Dr. Kishore Valladares.   Her last chemotherapy was on 03/15/2018. Appreciate Oncologist input.   -FU with Oncologist.     Anemia of chronic disorder:  Hemoglobin is 9.9-->9.4g/dl on 3/18/18.   -follow up with oncologist    Physical deconditioning/Generalized weakness:   -PT/OT said patient good to go home    # Pain Assessment:  Current Pain Score 3/20/2018 3/20/2018 3/19/2018   Patient currently in pain? denies yes denies   Pain score (0-10) 0 2 -   Pain location - Chest -   Pain descriptors - - -   - Lakisha is experiencing pain due to chest pain from pneumonia. Pain management was discussed and the plan was created in a collaborative fashion.  Lakisha's response to the current recommendations: engaged  - Opioid regimen: oxycodone PRN  - Response to opioid medications: Reduction of symptoms  - Bowel regimen: not needed  - Pharmacologic adjuvants: Acetaminophen    DVT Prophylaxis: Enoxaparin (Lovenox) SQ  Code Status: Full Code    Disposition: Expected discharge tomorrow if afebrile and improved respiratory status.    Curly Yang MD  [phone] (C)  [phone] (P)  Text Page (7 am to 6 pm)    Interval History   Still short of breath and very weak.  Not feeling safe to go home.  Needs one more night in hospital. Cough still severe but does not want medication for this.  No fevers. Chest pain minimal and controlled.    -Data reviewed today: I reviewed all new labs and imaging results over the last 24 hours. I personally reviewed no images or EKG's today.    Physical Exam   Temp: 97.8  F (36.6  C) Temp src: Oral BP: 146/87 Pulse: 74 Heart Rate: 75 Resp: 16 SpO2: 95 % O2 Device: None (Room air)    Vitals:    03/18/18 0644 03/19/18 0629 03/20/18 0704   Weight: 71.7 kg (158 lb)  72.1 kg (159 lb) 71.2 kg (157 lb)     Vital Signs with Ranges  Temp:  [97.8  F (36.6  C)-98  F (36.7  C)] 97.8  F (36.6  C)  Pulse:  [74] 74  Heart Rate:  [75-90] 75  Resp:  [16] 16  BP: (130-171)/(82-92) 146/87  SpO2:  [95 %] 95 %  I/O last 3 completed shifts:  In: 480 [P.O.:480]  Out: -     Constitutional: Awake, alert, cooperative, no apparent distress  Respiratory: Crackles right lower lobe, no wheezing  Cardiovascular: Regular rate and rhythm, normal S1 and S2, and no murmur noted  GI: Normal bowel sounds, soft, non-distended, non-tender  Skin/Integumen: No rashes, no cyanosis, no edema  Other:     Medications       saccharomyces boulardii  250 mg Oral BID     acetaminophen  1,000 mg Oral TID     cholecalciferol  1,000 Units Oral Daily     enoxaparin  40 mg Subcutaneous Q24H     cefTRIAXone  2 g Intravenous Q24H     azithromycin  250 mg Intravenous Q24H       Data     Recent Labs  Lab 03/20/18  0630 03/18/18  0800 03/17/18  1750 03/17/18  1235   WBC  --  6.3  --  6.3   HGB  --  9.4*  --  9.9*   MCV  --  96  --  97    201 210 236   NA  --  136  --  133   POTASSIUM  --  3.9  --  4.3   CHLORIDE  --  106  --  100   CO2  --  22  --  25   BUN  --  15  --  15   CR 0.77 0.71 0.78 0.78   ANIONGAP  --  8  --  8   ALEJANDRINA  --  8.1*  --  8.6   GLC  --  193*  --  146*   ALBUMIN  --   --   --  3.4   PROTTOTAL  --   --   --  7.4   BILITOTAL  --   --   --  0.8   ALKPHOS  --   --   --  70   ALT  --   --   --  23   AST  --   --   --  37   LIPASE  --   --   --  197   TROPI  --   --   --  <0.015       No results found for this or any previous visit (from the past 24 hour(s)).

## 2018-03-20 NOTE — PROGRESS NOTES
Attempted PIV x 2 with US without success Lt. Arm unavailable because of lymphedema.  Very limited PIV access because of chemotheraphy.  Nurse to call MD>

## 2018-03-20 NOTE — PLAN OF CARE
Problem: Patient Care Overview  Goal: Plan of Care/Patient Progress Review  Outcome: Improving  VSS. On RA. Up and Independent. R LS crackles. Tolerating reg. Diet. No complaints of pain. Frequent cough with small amount of clear sputum. Edema almost back to baseline.

## 2018-03-21 VITALS
OXYGEN SATURATION: 96 % | SYSTOLIC BLOOD PRESSURE: 141 MMHG | RESPIRATION RATE: 16 BRPM | DIASTOLIC BLOOD PRESSURE: 83 MMHG | WEIGHT: 150.2 LBS | HEIGHT: 61 IN | BODY MASS INDEX: 28.36 KG/M2 | TEMPERATURE: 98.2 F | HEART RATE: 81 BPM

## 2018-03-21 PROCEDURE — 25000132 ZZH RX MED GY IP 250 OP 250 PS 637: Mod: GY | Performed by: INTERNAL MEDICINE

## 2018-03-21 PROCEDURE — A9270 NON-COVERED ITEM OR SERVICE: HCPCS | Mod: GY | Performed by: INTERNAL MEDICINE

## 2018-03-21 PROCEDURE — 99239 HOSP IP/OBS DSCHRG MGMT >30: CPT | Performed by: INTERNAL MEDICINE

## 2018-03-21 RX ORDER — LEVOFLOXACIN 250 MG/1
500 TABLET, FILM COATED ORAL DAILY
Qty: 12 TABLET | Refills: 0 | Status: SHIPPED | OUTPATIENT
Start: 2018-03-21 | End: 2018-03-27

## 2018-03-21 RX ADMIN — Medication 250 MG: at 08:07

## 2018-03-21 RX ADMIN — LEVOFLOXACIN 500 MG: 250 TABLET, FILM COATED ORAL at 08:07

## 2018-03-21 RX ADMIN — DIPHENHYDRAMINE HYDROCHLORIDE 25 MG: 25 CAPSULE ORAL at 01:49

## 2018-03-21 RX ADMIN — CHOLECALCIFEROL TAB 25 MCG (1000 UNIT) 1000 UNITS: 25 TAB at 08:07

## 2018-03-21 NOTE — PLAN OF CARE
Problem: Patient Care Overview  Goal: Plan of Care/Patient Progress Review  BP slightly elevated, afebrile. On room air. Denies SOB. Voids in bathroom. Up independently. Loss IV access, IV team unable to start another one. PO Levaquin started.  Plan to discharge home tomorrow

## 2018-03-21 NOTE — PROGRESS NOTES
Chart Check Heme/Onc:  Lakisha is going home today and she is feeling ready to do so.  She has follow up with Dr. Valladares 3/29/2018.    Jeramie PEPE, CNP

## 2018-03-21 NOTE — PLAN OF CARE
Problem: Patient Care Overview  Goal: Plan of Care/Patient Progress Review  Outcome: No Change  A&Ox4. VSS on RA ex HTN. LS clear. Up independently. Regular diet. No IV access. Plan for possible d/c today.

## 2018-03-21 NOTE — DISCHARGE INSTRUCTIONS
Please seek medical attention for a fever greater than 101.0, dizziness, shortness of breath, chest pain, nausea or vomiting

## 2018-03-21 NOTE — PLAN OF CARE
Problem: Patient Care Overview  Goal: Plan of Care/Patient Progress Review  Outcome: Adequate for Discharge Date Met: 03/21/18  Pt is stable and asymptomatic,  VSS, DC instructions read and reviewed, questions answered. . DC to home.

## 2018-03-21 NOTE — DISCHARGE SUMMARY
[facility]    Discharge Summary  Hospitalist    Date of Admission:  3/17/2018  Date of Discharge:  3/21/2018    Discharge Diagnoses      Community acquired pneumonia of right middle lobe of lung (H)  Acute metabolic encephalopathy    History of Present Illness   Lakisha Burroughs is an 82-year-old  female with past medical history notable for colon cancer and metastatic breast cancer on active chemo, chronic anemia, who presented to the [facility] ER on 3/17/18 due to complaints of worsening generalized weakness since Dec 2017, dry cough for several months and dyspnea for 1-2 weeks, and confusion. Chest x-rays on 3/17/18 was normal but CT Chest on 3/17/18 revealed patchy right lower lobe infiltrates and fairly extensive right middle lobe consolidation with elevated left hemidiaphragm. CT Head on 3/17/18 revealed, diffuse cerebral volume loss and cerebral white matter changes, consistent with chronic small vessel ischemic disease. No evidence for acute intracranial pathology. Bilat Venous US on 3/17/18 was negative for DVT.      Hospital Course   Lakisha Burroughs was admitted on 3/17/2018.  The following problems were addressed during her hospitalization:     Right middle lobe and lower lobe community acquired pneumonia, with hypoxic respiratory failure: Blood cultures on 3/17/18 and sputum culture on 3/181/8 were both negative. Chest x-ray was negative but CT showed right lower and middle lobe pneumonia. -discharge with levaquin 500 mg daily for 6 more days  -follow up with oncologist in 1 week as planned with CBC and BMP prior to chemotherapy, oncology will need to decide if they should delay dose at all with this episode of pneumonia based on her progress  -can use cough medicine PRN but for now patient not wanting to use it     Abnormal Urinalysis likely asymptomatic colonization. Urine culture on 3/17/18 revealed urogenital jackson and coag negative staph that is likely contaminate with  repeat UA showing no WBC's.  -Levaquin would cover this as well but not thought to be a UTI     Prediabetes: HbA1C was 6.4% on 3/18/18.   -continue diet control     Metastatic breast cancer:  She is currently undergoing chemotherapy with Herceptin, Taxol and Zometa under the care of Dr. Kishore Valladares.   Her last chemotherapy was on 03/15/2018. Appreciate Oncologist input.   -FU with Oncologist.      Anemia of chronic disorder:  Hemoglobin is 9.9-->9.4g/dl on 3/18/18.   -follow up with oncologist     Physical deconditioning/Generalized weakness:   -PT/OT said patient good to go home without additional therapy needs  -continue home exercises    # Discharge Pain Plan:   - During her hospitalization, Lakisha experienced pain due to cancer.  The pain plan for discharge was discussed with Lakisha and the plan was created in a collaborative fashion.    - Pharmacologic adjuvants:  Acetaminophen    Curly Yang MD  [phone] (C)  [phone] (P)  Text Page (7 am to 6 pm)    Significant Results and Procedures   Chest CT showed right middle and lower lobe consolidations consistent with pneumonia.    Pending Results   These results will be called out if they became positive. Patient discharged on Levaquin.  Unresulted Labs Ordered in the Past 30 Days of this Admission     Date and Time Order Name Status Description    3/17/2018 1735 Blood culture Preliminary     3/17/2018 1735 Blood culture Preliminary         Code Status   Full Code       Primary Care Physician   Kishore Valladares    Physical Exam   Temp: 98.2  F (36.8  C) Temp src: Oral BP: 141/83 Pulse: 81 Heart Rate: 81 Resp: 16 SpO2: 96 % O2 Device: None (Room air)    Vitals:    03/19/18 0629 03/20/18 0704 03/21/18 0439   Weight: 72.1 kg (159 lb) 71.2 kg (157 lb) 68.1 kg (150 lb 3.2 oz)     Vital Signs with Ranges  Temp:  [97.7  F (36.5  C)-98.3  F (36.8  C)] 98.2  F (36.8  C)  Pulse:  [81-82] 81  Heart Rate:  [81-87] 81  Resp:  [16] 16  BP: (141-169)/(83-98) 141/83  SpO2:   [96 %-97 %] 96 %  I/O last 3 completed shifts:  In: 610 [P.O.:610]  Out: -     Constitutional: Awake, alert, cooperative, no apparent distress  Respiratory: Mild decreased breath sounds right lung base, no crackles or wheezing  Cardiovascular: Regular rate and rhythm, normal S1 and S2, and no murmur noted  GI: Normal bowel sounds, soft, non-distended, non-tender  Skin/Integumen: No rashes, no cyanosis, no edema  Other:     Discharge Disposition   Discharged to home  Condition at discharge: Stable    Consultations This Hospital Stay   HEMATOLOGY & ONCOLOGY IP CONSULT  PHYSICAL THERAPY ADULT IP CONSULT  OCCUPATIONAL THERAPY ADULT IP CONSULT    Time Spent on this Encounter   I, Curly Yang, personally saw the patient today and spent greater than 30 minutes discharging this patient.    Discharge Orders     Reason for your hospital stay   You were admitted for pneumonia in your right middle and lower lung.  You were treated with antibiotics and you are improving.  You should complete an additional 6 days of levaquin (antibiotic).  You can take robitussin DM cough medicine as needed but do not need to take it if you are comfortable without it. We stopped your naproxen because it can worsen kidney function and acetaminophen is a safer over the counter pain medicine for you to use for now. Follow up with your oncologist for ongoing cancer treatment. It was a pleasure for our Hospitalist group and me personally to care for you at [facility].  We wish you a speedy recovery and good health!     Follow-up and recommended labs and tests    Follow up with primary care provider, Kishore Valladares, or your oncologist within 7 days for hospital follow- up.  The following labs/tests are recommended: CBC and BMP.  Follow up with oncology as planned     Activity   Your activity upon discharge: activity as tolerated     Full Code     Diet   Follow this diet upon discharge: Regular diet       Discharge Medications    Current Discharge Medication List      START taking these medications    Details   levofloxacin (LEVAQUIN) 250 MG tablet Take 2 tablets (500 mg) by mouth daily for 6 days  Qty: 12 tablet, Refills: 0    Associated Diagnoses: Community acquired pneumonia of right middle lobe of lung (H)         CONTINUE these medications which have NOT CHANGED    Details   calcium carbonate-vitamin D 600-400 MG-UNIT CHEW Take 1 tablet by mouth daily      acetaminophen (TYLENOL) 500 MG tablet Take 2 tablets (1,000 mg) by mouth 3 times daily  Refills: 0    Associated Diagnoses: Viral URI      ZOLPIDEM TARTRATE PO Take 5 mg by mouth nightly as needed for sleep      Cyanocobalamin (VITAMIN B 12 PO) Take 1 tablet by mouth daily Chewable tablet      Multiple Vitamins-Minerals (CENTRUM SILVER) per tablet Take 1 tablet by mouth daily      VITAMIN D, CHOLECALCIFEROL, PO Take 1,000 Units by mouth daily      DiphenhydrAMINE HCl (BENADRYL PO) Take 25 mg by mouth nightly as needed         STOP taking these medications       guaiFENesin (MUCINEX) 600 MG 12 hr tablet Comments:   Reason for Stopping:         Naproxen Sodium (ALEVE PO) Comments:   Reason for Stopping:             Allergies   Allergies   Allergen Reactions     Iodine Hives     Had iodine allergy 30 yrs ago gets premeds prior to tests. Unsure what the allergy was, Unknown any further details     Data   Most Recent 3 CBC's:  Recent Labs   Lab Test  03/20/18   0630  03/18/18   0800  03/17/18   1750  03/17/18   1235  09/26/17   2035   WBC   --   6.3   --   6.3  4.7   HGB   --   9.4*   --   9.9*  10.8*   MCV   --   96   --   97  92   PLT  239  201  210  236  186      Most Recent 3 BMP's:  Recent Labs   Lab Test  03/20/18   0630  03/18/18   0800  03/17/18   1750  03/17/18   1235  09/26/17   2035   NA   --   136   --   133  134   POTASSIUM   --   3.9   --   4.3  3.8   CHLORIDE   --   106   --   100  100   CO2   --   22   --   25  25   BUN   --   15   --   15  11   CR  0.77  0.71  0.78  0.78   0.94   ANIONGAP   --   8   --   8  9   ALEJANDRINA   --   8.1*   --   8.6  10.0   GLC   --   193*   --   146*  134*     Most Recent 2 LFT's:  Recent Labs   Lab Test  03/17/18   1235   AST  37   ALT  23   ALKPHOS  70   BILITOTAL  0.8     Most Recent INR's and Anticoagulation Dosing History:  Anticoagulation Dose History     There is no flowsheet data to display.        Most Recent 3 Troponin's:  Recent Labs   Lab Test  03/17/18   1235   TROPI  <0.015     Most Recent Cholesterol Panel:No lab results found.  Most Recent 6 Bacteria Isolates From Any Culture (See EPIC Reports for Culture Details):  Recent Labs   Lab Test  03/18/18   0100  03/17/18   1810  03/17/18   1750  03/17/18   1346   CULT  Heavy growth  Normal jackson    No growth after 4 days  No growth after 4 days  10,000 to 50,000 colonies/mL  Coagulase negative Staphylococcus  *  <10,000 colonies/mL  mixed urogenital jackson  Susceptibility testing not routinely done       Most Recent TSH, T4 and A1c Labs:  Recent Labs   Lab Test  03/18/18   0800  03/17/18   1235   TSH   --   0.85   A1C  6.4*   --        Results for orders placed or performed during the hospital encounter of 03/17/18   XR Chest 2 Views    Narrative    CHEST TWO VIEWS  3/17/2018 1:18 PM     COMPARISON: Two view chest x-ray 9/26/2017.    HISTORY: Cough    FINDINGS: The cardiac silhouette, pulmonary vasculature, lungs and  pleural spaces are within normal limits.      Impression    IMPRESSION: Clear lungs.    KEE RIOS MD   CT Head w/o Contrast    Narrative    CT OF THE HEAD WITHOUT CONTRAST 3/17/2018 2:20 PM     COMPARISON: None    HISTORY: Brief aphasia, evaluate bleed or mass.    TECHNIQUE: 5 mm thick axial CT images of the head were acquired  without IV contrast material.    FINDINGS: There is mild diffuse cerebral volume loss. There are subtle  patchy areas of decreased density in the cerebral white matter  bilaterally that are consistent with sequela of chronic small vessel  ischemic  disease.    The ventricles and basal cisterns are within normal limits in  configuration given the degree of cerebral volume loss. There is no  midline shift. There are no extra-axial fluid collections.    No intracranial hemorrhage, mass or recent infarct.    The visualized paranasal sinuses are well-aerated. There is no  mastoiditis. There are no fractures of the visualized bones.      Impression    IMPRESSION: Diffuse cerebral volume loss and cerebral white matter  changes consistent with chronic small vessel ischemic disease. No  evidence for acute intracranial pathology.        Radiation dose for this scan was reduced using automated exposure  control, adjustment of the mA and/or kV according to patient size, or  iterative reconstruction technique    KEE RIOS MD   US Lower Extremity Venous Duplex Bilateral    Narrative    ULTRASOUND VENOUS LOWER EXTREMITY BILATERAL 3/17/2018 4:34 PM     HISTORY: Lower extremity edema, breast cancer, evaluate for DVT.      COMPARISON: None.    TECHNIQUE: Ultrasound gray scale, Color Doppler flow, and spectral  Doppler waveform analysis performed.    FINDINGS: The bilateral common femoral, superficial femoral, popliteal  and posterior tibial veins are patent and fully compressible and  demonstrate normal venous Doppler flow. The visualized greater  saphenous veins are negative for thrombus. Probable left Baker's cyst  measuring 4.7 x 3.2 x 1.9 cm.      Impression    IMPRESSION: No DVT demonstrated.     JORGE DRISCOLL MD   CT Chest Pulmonary Embolism w Contrast    Narrative    CT CHEST AND PULMONARY EMBOLISM ANGIOGRAM  3/17/2018 5:18 PM     HISTORY: Hypoxia.    COMPARISON: None.    TECHNIQUE: Volumetric helical acquisition of CT images of the chest  from the lung apices to the kidneys were acquired after the  administration of 59 mL Isovue-370  IV contrast. Radiation dose for  this scan was reduced using automated exposure control, adjustment of  the mA and/or kV according to  patient size, or iterative  reconstruction technique.    FINDINGS: There is no pulmonary embolism. Elevated left hemidiaphragm.  Patchy infiltrates at the right base. The heart may be generous  particularly the right side. The esophagus is patulous. Left breast  reconstruction changes. Thoracic aorta is atherosclerotic without  evidence of dissection or aneurysm. There is no pleural or pericardial  effusion.  There is no pneumothorax. Adrenal glands are normal.  Remainder of the visualized upper abdomen is unremarkable.      Impression    IMPRESSION:   1. No pulmonary embolism demonstrated.  2. No thoracic aortic dissection or aneurysm.   3. Patchy right lower lobe infiltrates and fairly extensive right  middle lobe consolidation.  4. Elevated left hemidiaphragm.      JORGE DRISCOLL MD

## 2018-03-23 LAB
BACTERIA SPEC CULT: NO GROWTH
BACTERIA SPEC CULT: NO GROWTH
Lab: NORMAL
SPECIMEN SOURCE: NORMAL
SPECIMEN SOURCE: NORMAL

## 2018-10-10 ENCOUNTER — HOSPITAL ENCOUNTER (OUTPATIENT)
Dept: CARDIOLOGY | Facility: CLINIC | Age: 83
Discharge: HOME | End: 2018-10-10
Attending: NURSE PRACTITIONER | Admitting: NURSE PRACTITIONER
Payer: MEDICARE

## 2018-10-10 DIAGNOSIS — Z51.11 ENCOUNTER FOR ANTINEOPLASTIC CHEMOTHERAPY: ICD-10-CM

## 2018-10-10 DIAGNOSIS — C50.812 MALIGNANT NEOPLASM OF OVERLAPPING SITES OF LEFT FEMALE BREAST, UNSPECIFIED ESTROGEN RECEPTOR STATUS (H): ICD-10-CM

## 2018-10-10 PROCEDURE — 93321 DOPPLER ECHO F-UP/LMTD STD: CPT | Mod: 26 | Performed by: INTERNAL MEDICINE

## 2018-10-10 PROCEDURE — 93325 DOPPLER ECHO COLOR FLOW MAPG: CPT | Mod: 26 | Performed by: INTERNAL MEDICINE

## 2018-10-10 PROCEDURE — 93308 TTE F-UP OR LMTD: CPT | Mod: 26 | Performed by: INTERNAL MEDICINE

## 2018-10-10 PROCEDURE — 93321 DOPPLER ECHO F-UP/LMTD STD: CPT

## 2018-12-11 ENCOUNTER — NURSE TRIAGE (OUTPATIENT)
Dept: NURSING | Facility: CLINIC | Age: 83
End: 2018-12-11

## 2018-12-11 NOTE — TELEPHONE ENCOUNTER
Pt called . Since 3/17/18 has been having bilateral ankle pain and stiffness , due to loose joints after getting an antibiotic and has seen 2 podiatrists at [facility] .   FNA advised getting a 2nd opinion from  or [place] podiatrist , but calling her Podiatrist for referral and suggestions.   Verbalizes understanding and denies further questions and will call back if further symptoms to triage or questions  ..  Isa Vela RN  - [place] Nurse Advisor

## 2019-11-01 ENCOUNTER — TRANSFERRED RECORDS (OUTPATIENT)
Dept: HEALTH INFORMATION MANAGEMENT | Facility: CLINIC | Age: 84
End: 2019-11-01

## 2020-02-04 ENCOUNTER — TRANSFERRED RECORDS (OUTPATIENT)
Dept: HEALTH INFORMATION MANAGEMENT | Facility: CLINIC | Age: 85
End: 2020-02-04

## 2021-01-01 ENCOUNTER — DOCUMENTATION ONLY (OUTPATIENT)
Dept: OTHER | Facility: CLINIC | Age: 86
End: 2021-01-01

## 2021-01-01 ENCOUNTER — NURSING HOME VISIT (OUTPATIENT)
Dept: GERIATRICS | Facility: CLINIC | Age: 86
End: 2021-01-01
Payer: MEDICARE

## 2021-01-01 VITALS
DIASTOLIC BLOOD PRESSURE: 78 MMHG | OXYGEN SATURATION: 95 % | BODY MASS INDEX: 30.47 KG/M2 | SYSTOLIC BLOOD PRESSURE: 137 MMHG | HEIGHT: 61 IN | TEMPERATURE: 97.2 F | HEART RATE: 82 BPM | WEIGHT: 161.4 LBS | RESPIRATION RATE: 18 BRPM

## 2021-01-01 VITALS
HEART RATE: 101 BPM | BODY MASS INDEX: 30.43 KG/M2 | SYSTOLIC BLOOD PRESSURE: 151 MMHG | DIASTOLIC BLOOD PRESSURE: 89 MMHG | TEMPERATURE: 98.1 F | HEIGHT: 61 IN | OXYGEN SATURATION: 93 % | WEIGHT: 161.2 LBS | RESPIRATION RATE: 16 BRPM

## 2021-01-01 DIAGNOSIS — Z51.5 HOSPICE CARE PATIENT: ICD-10-CM

## 2021-01-01 DIAGNOSIS — L89.310 PRESSURE INJURY OF RIGHT BUTTOCK, UNSTAGEABLE (H): ICD-10-CM

## 2021-01-01 DIAGNOSIS — R31.9 URINARY TRACT INFECTION WITH HEMATURIA, SITE UNSPECIFIED: ICD-10-CM

## 2021-01-01 DIAGNOSIS — G62.0 CHEMOTHERAPY-INDUCED NEUROPATHY (H): ICD-10-CM

## 2021-01-01 DIAGNOSIS — N39.0 URINARY TRACT INFECTION WITH HEMATURIA, SITE UNSPECIFIED: ICD-10-CM

## 2021-01-01 DIAGNOSIS — E11.9 TYPE 2 DIABETES MELLITUS WITHOUT COMPLICATION, WITHOUT LONG-TERM CURRENT USE OF INSULIN (H): Primary | ICD-10-CM

## 2021-01-01 DIAGNOSIS — C50.919 METASTATIC BREAST CANCER: Primary | ICD-10-CM

## 2021-01-01 DIAGNOSIS — M48.02 SPINAL STENOSIS IN CERVICAL REGION: ICD-10-CM

## 2021-01-01 DIAGNOSIS — T45.1X5A CHEMOTHERAPY-INDUCED NEUROPATHY (H): ICD-10-CM

## 2021-01-01 DIAGNOSIS — R62.7 FAILURE TO THRIVE IN ADULT: ICD-10-CM

## 2021-01-01 DIAGNOSIS — E11.9 TYPE 2 DIABETES MELLITUS WITHOUT COMPLICATION, WITHOUT LONG-TERM CURRENT USE OF INSULIN (H): ICD-10-CM

## 2021-01-01 DIAGNOSIS — C50.919 METASTATIC BREAST CANCER: ICD-10-CM

## 2021-01-01 PROCEDURE — 99310 SBSQ NF CARE HIGH MDM 45: CPT | Mod: GV | Performed by: NURSE PRACTITIONER

## 2021-01-01 PROCEDURE — 99305 1ST NF CARE MODERATE MDM 35: CPT | Mod: GW | Performed by: INTERNAL MEDICINE

## 2021-01-01 RX ORDER — HALOPERIDOL 2 MG/ML
1 SOLUTION ORAL EVERY 4 HOURS PRN
COMMUNITY
Start: 2021-01-01

## 2021-01-01 RX ORDER — TRAZODONE HYDROCHLORIDE 50 MG/1
25 TABLET, FILM COATED ORAL AT BEDTIME
COMMUNITY
Start: 2021-01-01

## 2021-01-01 RX ORDER — MORPHINE SULFATE 100 MG/5ML
5 SOLUTION ORAL EVERY 4 HOURS PRN
COMMUNITY
Start: 2021-01-01 | End: 2021-01-01

## 2021-01-01 RX ORDER — LORAZEPAM 2 MG/ML
0.5 CONCENTRATE ORAL EVERY 4 HOURS PRN
COMMUNITY
Start: 2021-01-01

## 2021-01-01 RX ORDER — BISACODYL 10 MG
10 SUPPOSITORY, RECTAL RECTAL EVERY OTHER DAY
COMMUNITY
Start: 2021-01-01

## 2021-01-01 ASSESSMENT — MIFFLIN-ST. JEOR
SCORE: 1113.58
SCORE: 1114.49

## 2021-03-08 NOTE — PATIENT INSTRUCTIONS
Orders  Lakisha Burroughs  1935    1) Please ensure end date of 14 days added to PRN haldol and ativan. Please update provider with number of uses prior to order expiration for renewal.  2) C-collar to remain in place except for hygiene   Serena Whiting, AFSHIN CNP on 3/8/2021 at 3:42 PM

## 2021-03-08 NOTE — LETTER
"    3/8/2021        RE: Lakisha Burroughs  [address]  [place] [address]        [facility]  PRIMARY CARE PROVIDER AND CLINIC:  Kishore Valladares MD, [place] [facility] [address] [facility] [address] / [place]*  Chief Complaint   Patient presents with     [facility] F/U     [facility] Record Number:  [id number]  Place of Service where encounter took place:  [facility]  () [[id number]]    Lakisha Burroughs  is a 85 year old  (1935), admitted to the above facility from  [facility]. Hospital stay 3/1/21 through 3/6/21..  Admitted to this facility for  rehab, medical management and nursing care.    HPI:    HPI information obtained from: facility chart records, facility staff, patient report and [facility] chart review, care everwhere.   Brief Summary of Hospital Course: Lakisha Burroughs is an 85 year old female with PMH significant for history of colon cancer (in remission), type 2 DM, metastatic breast cancer with mets to liver and bone, chemotherapy induced neuropathy and atrial ectopy. Presented to hospital after a fall. Was found to have UTI and treated with antibiotics. MRI of c spine showed multi level high grade bilateral neural foraminal narrowing along with severe severe stenosis of spinal canal at C6-7 and questionable T2 hyperintensity within central spinal cord at C6-7 and T2-3.  She received IVF for LUPILLO. Also found to have unstageable pressure sore on right buttocks. It was decided she discharge to nursing home with hospice care.     Updates on Status Since Skilled nursing Admission: Lakisha was seen today for admission visit. Reports she is comfortable. Is feeling well. Most questions asked she responds with, \"I don't think so.\" Nursing staff without concerns. VS reviewed.    CODE STATUS/ADVANCE DIRECTIVES DISCUSSION:  DNR/ DNI    Patient's living condition: lives alone  ALLERGIES: Iodine  PAST MEDICAL HISTORY:  has a past medical history of Cancer " "(H).  PAST SURGICAL HISTORY:   has a past surgical history that includes orthopedic surgery and Breast surgery.  FAMILY HISTORY: family history is not on file.  SOCIAL HISTORY:   reports that she has never smoked. She has never used smokeless tobacco. She reports that she does not drink alcohol or use drugs.    Post Discharge Medication Reconciliation Status: discharge medications reconciled and changed, per note/orders    Current Outpatient Medications   Medication Sig Dispense Refill     acetaminophen (TYLENOL) 500 MG tablet Take 2 tablets (1,000 mg) by mouth 3 times daily  0     bisacodyl (DULCOLAX) 10 MG suppository Place 10 mg rectally every other day       haloperidol (HALDOL) 2 MG/ML (HIGH CONC) solution Take 1 mg by mouth every 4 hours as needed       hyoscyamine SL (LEVSIN/SL) 0.125 MG sublingual tablet Place 0.125 mg under the tongue every 4 hours as needed       LORazepam (ATIVAN) 2 MG/ML (HIGH CONC) solution Take 0.5 mg by mouth every 4 hours as needed       morphine sulfate, high concentrate, (ROXANOL-CONCENTRATED) 20 MG/ML concentrated solution Place 5 mg under the tongue every 4 hours as needed       traZODone (DESYREL) 50 MG tablet Take 25 mg by mouth At Bedtime         ROS:  4 point ROS including Respiratory, CV, GI and , other than that noted in the HPI,  is negative    Vitals:  BP (!) 151/89   Pulse 101   Temp 98.1  F (36.7  C)   Resp 16   Ht 1.549 m (5' 1\")   Wt 73.1 kg (161 lb 3.2 oz)   SpO2 93%   BMI 30.46 kg/m    Exam:  GENERAL APPEARANCE:  Alert, in NAD  HEENT: normocephalic, moist mucous membranes, nose without drainage or crusting  RESP:  respiratory effort normal, no respiratory distress, Lung sounds clear anteriorly, patient is on RA  CV: auscultation of heart done, rate and rhythm regular. no murmur, no rub or gallop.   ABDOMEN: + bowel sounds, soft, nontender, no grimacing or guarding with palpation.  M/S: no lower extremity edema  NEURO: cranial nerves 2-12 grossly intact and " at patient's baseline; no facial asymmetry, no speech deficits and able to follow directions  PSYCH: affect and mood normal    Lab/Diagnostic data:  Labs done in SNF are in [place] EPIC. Please refer to them using incred[facility]/Care Everywhere. and Recent labs in EPIC reviewed by me today.     ASSESSMENT/PLAN:  (C50.919) Metastatic breast cancer (H)  (primary encounter diagnosis)  (Z51.5) Hospice care patient  Comment: with mets to sternum most recently  -diagnosed in 2009 and reoccurence in 2017  -completed chemotherapy, radiation in the past  -is comfortable today  Plan: Appreciate hospice involvement. Tylenol, morphine for comfort. Noted initial PRN orders for non antipsychotic psychotropic medications ativan and haldol are limited to 14 days. Start new psychotropic medication today x 14 days for end-stage palliative care goals. Nsg to update [facility] provider of effect prior to end of 14 days    (N39.0,  R31.9) Urinary tract infection with hematuria, site unspecified  Comment: treated with antibiotics inpatient- no further symptoms  Plan: Encourage hydration, monitor for symptoms.    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 Pain management including tylenol scheduled and morphine PRN.     (R62.7) Failure to thrive in adult  Comment: with poor po intake, falls at home, general decline  Plan: Ok for regular diet. Hospice to follow for comfort    (E11.9) Type 2 diabetes mellitus without complication, without long-term current use of insulin (H)  Comment: chronic  -last a1c 8.6 on 3/3/2021  Plan: Ok for no further BS checks due to patient on hospice. Ok for regular diet.     (G62.0,  T45.1X5A) Chemotherapy-induced neuropathy (H)  Comment: per history, no pain reports today  Plan:  Continue tylenol, morphine for pain.     Total time spent with patient visit at the skilled nursing facility was 36 minutes including patient visit, review of past records and discussion with patient and nursing staff today. Greater than 50% of total time spent with counseling and coordinating care due to new admission to facility, discussion regarding plan of care/ orders at facility.     Electronically signed by:  AFSHIN Garrett CNP                        Sincerely,        AFSHIN Garrett CNP

## 2021-03-08 NOTE — PROGRESS NOTES
"[facility]  PRIMARY CARE PROVIDER AND CLINIC:  Kishore Valladares MD, [place] [facility] [address] / [place]*  Chief Complaint   Patient presents with     [facility] F/U     [place] Medical Record Number:  [id number]  Place of Service where encounter took place:  [facility]  () [[id number]]    Lakisha Burroughs  is a 85 year old  (1935), admitted to the above facility from  [facility]. Hospital stay 3/1/21 through 3/6/21..  Admitted to this facility for  rehab, medical management and nursing care.    HPI:    HPI information obtained from: facility chart records, facility staff, patient report and [facility] chart review, care everwhere.   Brief Summary of Hospital Course: Lakisha Burroughs is an 85 year old female with PMH significant for history of colon cancer (in remission), type 2 DM, metastatic breast cancer with mets to liver and bone, chemotherapy induced neuropathy and atrial ectopy. Presented to hospital after a fall. Was found to have UTI and treated with antibiotics. MRI of c spine showed multi level high grade bilateral neural foraminal narrowing along with severe severe stenosis of spinal canal at C6-7 and questionable T2 hyperintensity within central spinal cord at C6-7 and T2-3.  She received IVF for LUPILLO. Also found to have unstageable pressure sore on right buttocks. It was decided she discharge to nursing home with hospice care.     Updates on Status Since Skilled nursing Admission: Lakisha was seen today for admission visit. Reports she is comfortable. Is feeling well. Most questions asked she responds with, \"I don't think so.\" Nursing staff without concerns. VS reviewed.    CODE STATUS/ADVANCE DIRECTIVES DISCUSSION:  DNR/ DNI    Patient's living condition: lives alone  ALLERGIES: Iodine  PAST MEDICAL HISTORY:  has a past medical history of Cancer (H).  PAST SURGICAL HISTORY:   has a past surgical history that includes orthopedic " "surgery and Breast surgery.  FAMILY HISTORY: family history is not on file.  SOCIAL HISTORY:   reports that she has never smoked. She has never used smokeless tobacco. She reports that she does not drink alcohol or use drugs.    Post Discharge Medication Reconciliation Status: discharge medications reconciled and changed, per note/orders    Current Outpatient Medications   Medication Sig Dispense Refill     acetaminophen (TYLENOL) 500 MG tablet Take 2 tablets (1,000 mg) by mouth 3 times daily  0     bisacodyl (DULCOLAX) 10 MG suppository Place 10 mg rectally every other day       haloperidol (HALDOL) 2 MG/ML (HIGH CONC) solution Take 1 mg by mouth every 4 hours as needed       hyoscyamine SL (LEVSIN/SL) 0.125 MG sublingual tablet Place 0.125 mg under the tongue every 4 hours as needed       LORazepam (ATIVAN) 2 MG/ML (HIGH CONC) solution Take 0.5 mg by mouth every 4 hours as needed       morphine sulfate, high concentrate, (ROXANOL-CONCENTRATED) 20 MG/ML concentrated solution Place 5 mg under the tongue every 4 hours as needed       traZODone (DESYREL) 50 MG tablet Take 25 mg by mouth At Bedtime         ROS:  4 point ROS including Respiratory, CV, GI and , other than that noted in the HPI,  is negative    Vitals:  BP (!) 151/89   Pulse 101   Temp 98.1  F (36.7  C)   Resp 16   Ht 1.549 m (5' 1\")   Wt 73.1 kg (161 lb 3.2 oz)   SpO2 93%   BMI 30.46 kg/m    Exam:  GENERAL APPEARANCE:  Alert, in NAD  HEENT: normocephalic, moist mucous membranes, nose without drainage or crusting  RESP:  respiratory effort normal, no respiratory distress, Lung sounds clear anteriorly, patient is on RA  CV: auscultation of heart done, rate and rhythm regular. no murmur, no rub or gallop.   ABDOMEN: + bowel sounds, soft, nontender, no grimacing or guarding with palpation.  M/S: no lower extremity edema  NEURO: cranial nerves 2-12 grossly intact and at patient's baseline; no facial asymmetry, no speech deficits and able to follow " directions  PSYCH: affect and mood normal    Lab/Diagnostic data:  Labs done in SNF are in [place] EPIC. Please refer to them using EPIC/Care Everywhere. and Recent labs in EPIC reviewed by me today.     ASSESSMENT/PLAN:  (C50.919) Metastatic breast cancer (H)  (primary encounter diagnosis)  (Z51.5) Hospice care patient  Comment: with mets to sternum most recently  -diagnosed in 2009 and reoccurence in 2017  -completed chemotherapy, radiation in the past  -is comfortable today  Plan: Appreciate hospice involvement. Tylenol, morphine for comfort. Noted initial PRN orders for non antipsychotic psychotropic medications ativan and haldol are limited to 14 days. Start new psychotropic medication today x 14 days for end-stage palliative care goals. Nsg to update [facility] provider of effect prior to end of 14 days    (N39.0,  R31.9) Urinary tract infection with hematuria, site unspecified  Comment: treated with antibiotics inpatient- no further symptoms  Plan: Encourage hydration, monitor for symptoms.    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 Pain management including tylenol scheduled and morphine PRN.     (R62.7) Failure to thrive in adult  Comment: with poor po intake, falls at home, general decline  Plan: Ok for regular diet. Hospice to follow for comfort    (E11.9) Type 2 diabetes mellitus without complication, without long-term current use of insulin (H)  Comment: chronic  -last a1c 8.6 on 3/3/2021  Plan: Ok for no further BS checks due to patient on hospice. Ok for regular diet.     (G62.0,  T45.1X5A) Chemotherapy-induced neuropathy (H)  Comment: per history, no pain reports today  Plan: Continue tylenol, morphine for pain.     Total time spent with patient visit at  the skilled nursing facility was 36 minutes including patient visit, review of past records and discussion with patient and nursing staff today. Greater than 50% of total time spent with counseling and coordinating care due to new admission to facility, discussion regarding plan of care/ orders at facility.     Electronically signed by:  AFSHIN Garrett CNP

## 2021-03-23 NOTE — LETTER
3/23/2021        RE: Lakisha Burroughs  [address]  [place] [address]        Lakisha Burroughs is a 85 year old female seen March 23, 2021 at [facility] where she was admitted this month after [facility] stay 3/1-6 following a fall at home.  She was found down by a friend after unknown amount of time.  She was treated for a UTI, and with IVF for LUPILLO.    MRI of cervical spine showed multi-level high grade bilateral neural foraminal narrowing and severe stenosis of spinal canal at C6-7   She was found to have an unstageable pressure ulcer right buttocks.   At hospital discharge she was placed on Hospice and came to LTC for terminal care.     Pt has breast cancer with metastatic disease to liver and bone, and CTX induced peripheral neuropathy and atrial ectopy.     Today patient is seen in her room resting abed.   Reports feeling tired, but states she is comfortable.   She is quiet and does not give much other history.  No new concerns reported by LTC nursing staff.      PMH:   Colon cancer 1980s, s/p resection, CTX  DM2  Breast CA, dx 2009, recurrence 2017   S/p left mastectomy, CTX and XRT   Mets to liver and bone  Peripheral neuropathy  Atrial ectopy  Spinal stenosis    Past Surgical History:   Procedure Laterality Date     BREAST SURGERY       ORTHOPEDIC SURGERY       Social History     Tobacco Use     Smoking status: Never Smoker     Smokeless tobacco: Never Used   Substance Use Topics     Alcohol use: No      SH:   Previously lived alone in [facility].     She has a daughter Ruddy.       FH: father had cancer, mother had scleroderma    Review Of Systems  Skin: negative   Eyes: impaired vision  Ears/Nose/Throat: hearing loss  Respiratory: No shortness of breath, dyspnea on exertion, cough, or hemoptysis  Cardiovascular: irregular heart beat and chest pain  Gastrointestinal: poor appetite  Genitourinary: incontinence  Musculoskeletal: bedbound, dependent for cares  Neurologic: intermittently  "confused  Psychiatric: negative  Hematologic/Lymphatic/Immunologic: anemia  Endocrine: diabetes    GENERAL APPEARANCE: alert and no distress, frail  /78   Pulse 82   Temp 97.2  F (36.2  C)   Resp 18   Ht 1.549 m (5' 1\")   Wt 73.2 kg (161 lb 6.4 oz)   SpO2 95%   BMI 30.50 kg/m     HEENT: normocephalic, no lesion or abnormalities  NECK: no adenopathy, no asymmetry, masses, or scars and thyroid normal to palpation  RESP: lungs clear to auscultation - no rales, rhonchi or wheezes  CV: regular rate and rhythm, normal S1 S2  ABDOMEN:  soft, nontender, no HSM or masses and bowel sounds normal  MS: extremities normal- no gross deformities noted, ext without edema  SKIN: no suspicious lesions or rashes, +onychymycosis  NEURO: generalized weakness, non focal, speech normal  PSYCH: affect flat  LYMPHATICS: No cervical,  or supraclavicular nodes     LAB 3/2/2021:  Na 132   K 3.5   BUN/Cr 43/1.64  GFR 30   CRP 13.3   ESR 70  WBC 6.1  hgb 10.0  MCV 90  plts 290k    IMP/PLAN:    (E11.9) Type 2 diabetes mellitus without complication, without long-term current use of insulin (H)   Comment: A1C 8.6 %    Plan: no longer treated, comfort focus       (L89.310) Pressure injury of right buttock, unstageable (H)  Comment: not seen today  Plan: pressure relief, pain management, local wound care and dressing changes      (C50.919) Metastatic breast cancer (H)  (G62.0,  T45.1X5A) Chemotherapy-induced neuropathy (H)  Comment: failure to thrive    Plan: LTC support for transfers and mobility     Pain management acetaminophen, MS prn       (M48.02) Spinal stenosis in cervical region  Comment: as above  Plan: cervical collar, pain meds prn.        (Z51.5) Hospice care patient  Comment: prns available  Plan: treat to comfort     Brooke Carballo MD            Sincerely,        Brooke Carballo MD    "

## 2021-03-30 NOTE — PROGRESS NOTES
Lakisha Burroughs is a 85 year old female seen March 23, 2021 at [facility] where she was admitted this month after [facility] stay 3/1-6 following a fall at home.  She was found down by a friend after unknown amount of time.  She was treated for a UTI, and with IVF for LUPILLO.    MRI of cervical spine showed multi-level high grade bilateral neural foraminal narrowing and severe stenosis of spinal canal at C6-7   She was found to have an unstageable pressure ulcer right buttocks.   At hospital discharge she was placed on Hospice and came to LTC for terminal care.     Pt has breast cancer with metastatic disease to liver and bone, and CTX induced peripheral neuropathy and atrial ectopy.     Today patient is seen in her room resting abed.   Reports feeling tired, but states she is comfortable.   She is quiet and does not give much other history.  No new concerns reported by [facility] nursing staff.      PMH:   Colon cancer 1980s, s/p resection, CTX  DM2  Breast CA, dx 2009, recurrence 2017   S/p left mastectomy, CTX and XRT   Mets to liver and bone  Peripheral neuropathy  Atrial ectopy  Spinal stenosis    Past Surgical History:   Procedure Laterality Date     BREAST SURGERY       ORTHOPEDIC SURGERY       Social History     Tobacco Use     Smoking status: Never Smoker     Smokeless tobacco: Never Used   Substance Use Topics     Alcohol use: No      SH:   Previously lived alone in [facility].     She has a daughter Ruddy.       FH: father had cancer, mother had scleroderma    Review Of Systems  Skin: negative   Eyes: impaired vision  Ears/Nose/Throat: hearing loss  Respiratory: No shortness of breath, dyspnea on exertion, cough, or hemoptysis  Cardiovascular: irregular heart beat and chest pain  Gastrointestinal: poor appetite  Genitourinary: incontinence  Musculoskeletal: bedbound, dependent for cares  Neurologic: intermittently confused  Psychiatric: negative  Hematologic/Lymphatic/Immunologic: anemia  Endocrine:  "diabetes    GENERAL APPEARANCE: alert and no distress, frail  /78   Pulse 82   Temp 97.2  F (36.2  C)   Resp 18   Ht 1.549 m (5' 1\")   Wt 73.2 kg (161 lb 6.4 oz)   SpO2 95%   BMI 30.50 kg/m     HEENT: normocephalic, no lesion or abnormalities  NECK: no adenopathy, no asymmetry, masses, or scars and thyroid normal to palpation  RESP: lungs clear to auscultation - no rales, rhonchi or wheezes  CV: regular rate and rhythm, normal S1 S2  ABDOMEN:  soft, nontender, no HSM or masses and bowel sounds normal  MS: extremities normal- no gross deformities noted, ext without edema  SKIN: no suspicious lesions or rashes, +onychymycosis  NEURO: generalized weakness, non focal, speech normal  PSYCH: affect flat  LYMPHATICS: No cervical,  or supraclavicular nodes     LAB 3/2/2021:  Na 132   K 3.5   BUN/Cr 43/1.64  GFR 30   CRP 13.3   ESR 70  WBC 6.1  hgb 10.0  MCV 90  plts 290k    IMP/PLAN:    (E11.9) Type 2 diabetes mellitus without complication, without long-term current use of insulin (H)   Comment: A1C 8.6 %    Plan: no longer treated, comfort focus       (L89.310) Pressure injury of right buttock, unstageable (H)  Comment: not seen today  Plan: pressure relief, pain management, local wound care and dressing changes      (C50.919) Metastatic breast cancer (H)  (G62.0,  T45.1X5A) Chemotherapy-induced neuropathy (H)  Comment: failure to thrive    Plan: LTC support for transfers and mobility     Pain management acetaminophen, MS prn       (M48.02) Spinal stenosis in cervical region  Comment: as above  Plan: cervical collar, pain meds prn.        (Z51.5) Hospice care patient  Comment: prns available  Plan: treat to comfort     Brooke Carballo MD      "